# Patient Record
Sex: FEMALE | Race: WHITE | NOT HISPANIC OR LATINO | URBAN - METROPOLITAN AREA
[De-identification: names, ages, dates, MRNs, and addresses within clinical notes are randomized per-mention and may not be internally consistent; named-entity substitution may affect disease eponyms.]

---

## 2023-08-28 ENCOUNTER — HOSPITAL ENCOUNTER (OUTPATIENT)
Dept: DATA CONVERSION | Facility: HOSPITAL | Age: 68
Discharge: HOME | End: 2023-08-28
Payer: MEDICARE

## 2023-08-28 DIAGNOSIS — Z90.710 ACQUIRED ABSENCE OF BOTH CERVIX AND UTERUS: ICD-10-CM

## 2023-08-28 DIAGNOSIS — R10.32 LEFT LOWER QUADRANT PAIN: ICD-10-CM

## 2023-08-28 DIAGNOSIS — R31.9 HEMATURIA, UNSPECIFIED: ICD-10-CM

## 2023-08-28 DIAGNOSIS — R30.0 DYSURIA: ICD-10-CM

## 2023-08-28 DIAGNOSIS — R10.9 UNSPECIFIED ABDOMINAL PAIN: ICD-10-CM

## 2023-08-28 DIAGNOSIS — N93.9 ABNORMAL UTERINE AND VAGINAL BLEEDING, UNSPECIFIED: ICD-10-CM

## 2023-08-28 LAB
ABO + RH BLD: NORMAL
ANION GAP SERPL CALCULATED.3IONS-SCNC: 9 MMOL/L (ref 0–19)
BACTERIA SPEC CULT: NORMAL
BACTERIA UR QL AUTO: NEGATIVE
BASOPHILS # BLD AUTO: 0.02 K/UL (ref 0–0.22)
BASOPHILS NFR BLD AUTO: 0.2 % (ref 0–1)
BILIRUB UR QL STRIP.AUTO: NEGATIVE
BLD GP AB SCN SERPL QL: NEGATIVE
BLD PROD TYP BPU: NORMAL
BUN SERPL-MCNC: 14 MG/DL (ref 8–25)
BUN/CREAT SERPL: 20 RATIO (ref 8–21)
CALCIUM SERPL-MCNC: 9.1 MG/DL (ref 8.5–10.4)
CASTS NOT SPECIF #/AREA UR COMP ASSIST: ABNORMAL /LPF (ref 0–3)
CC # UR: NORMAL /UL
CHLORIDE SERPL-SCNC: 103 MMOL/L (ref 97–107)
CLARITY UR: ABNORMAL
CO2 SERPL-SCNC: 27 MMOL/L (ref 24–31)
COLOR UR: ABNORMAL
CREAT SERPL-MCNC: 0.7 MG/DL (ref 0.4–1.6)
DEPRECATED RDW RBC AUTO: 43.8 FL (ref 37–54)
DIFFERENTIAL METHOD BLD: ABNORMAL
EOSINOPHIL # BLD AUTO: 0.16 K/UL (ref 0–0.45)
EOSINOPHIL NFR BLD: 1.4 % (ref 0–3)
ERYTHROCYTE [DISTWIDTH] IN BLOOD BY AUTOMATED COUNT: 12.6 % (ref 11.7–15)
GFR SERPL CREATININE-BSD FRML MDRD: 95 ML/MIN/1.73 M2
GLUCOSE SERPL-MCNC: 113 MG/DL (ref 65–99)
GLUCOSE UR STRIP.AUTO-MCNC: NEGATIVE MG/DL
HCG UR QL: NEGATIVE
HCT VFR BLD AUTO: 41.2 % (ref 36–44)
HGB BLD-MCNC: 13.9 GM/DL (ref 12–15)
HGB UR QL STRIP.AUTO: 2545 /HPF (ref 0–3)
HGB UR QL: ABNORMAL
HX OF BLOOD TRANSFUSION: NORMAL
HYALINE CASTS UR QL AUTO: 15 /LPF
IMM GRANULOCYTES # BLD AUTO: 0.02 K/UL (ref 0–0.1)
KETONES UR QL STRIP.AUTO: NEGATIVE
LEUKOCYTE ESTERASE UR QL STRIP.AUTO: ABNORMAL
LYMPHOCYTES # BLD AUTO: 1.31 K/UL (ref 1.2–3.2)
LYMPHOCYTES NFR BLD MANUAL: 11.5 % (ref 20–40)
MCH RBC QN AUTO: 32 PG (ref 26–34)
MCHC RBC AUTO-ENTMCNC: 33.7 % (ref 31–37)
MCV RBC AUTO: 94.7 FL (ref 80–100)
MICROSCOPIC (UA): ABNORMAL
MONOCYTES # BLD AUTO: 0.67 K/UL (ref 0–0.8)
MONOCYTES NFR BLD MANUAL: 5.9 % (ref 0–8)
NEUTROPHILS # BLD AUTO: 9.25 K/UL
NEUTROPHILS # BLD AUTO: 9.25 K/UL (ref 1.8–7.7)
NEUTROPHILS.IMMATURE NFR BLD: 0.2 % (ref 0–1)
NEUTS SEG NFR BLD: 80.8 % (ref 50–70)
NITRITE UR QL STRIP.AUTO: NEGATIVE
NRBC BLD-RTO: 0 /100 WBC
NUM BPU REQUESTED: 0
PH UR STRIP.AUTO: 7 [PH] (ref 4.6–8)
PLATELET # BLD AUTO: 226 K/UL (ref 150–450)
PMV BLD AUTO: 10.6 CU (ref 7–12.6)
POTASSIUM SERPL-SCNC: 3.8 MMOL/L (ref 3.4–5.1)
PROT UR STRIP.AUTO-MCNC: 200 MG/DL
RBC # BLD AUTO: 4.35 M/UL (ref 4–4.9)
REPORT STATUS -LH SQ DATA CONVERSION: NORMAL
SERVICE CMNT-IMP: NORMAL
SODIUM SERPL-SCNC: 139 MMOL/L (ref 133–145)
SP GR UR STRIP.AUTO: 1.01 (ref 1–1.03)
SPECIMEN EXP DATE BLD: NORMAL
SPECIMEN SOURCE: NORMAL
SQUAMOUS UR QL AUTO: ABNORMAL /HPF
TEST ORDERED: NORMAL
TRANSF BAND NUM PATIENT: NORMAL
URINE CULTURE: ABNORMAL
UROBILINOGEN UR QL STRIP.AUTO: NORMAL MG/DL (ref 0–1)
WBC # BLD AUTO: 11.4 K/UL (ref 4.5–11)
WBC #/AREA URNS AUTO: 386 /HPF (ref 0–3)

## 2023-09-20 ENCOUNTER — HOSPITAL ENCOUNTER (OUTPATIENT)
Dept: DATA CONVERSION | Facility: HOSPITAL | Age: 68
Discharge: HOME | End: 2023-09-20
Payer: MEDICARE

## 2023-09-20 DIAGNOSIS — N89.8 OTHER SPECIFIED NONINFLAMMATORY DISORDERS OF VAGINA: ICD-10-CM

## 2023-09-21 DIAGNOSIS — N39.3 FEMALE STRESS INCONTINENCE: ICD-10-CM

## 2023-09-21 DIAGNOSIS — N94.10 DYSPAREUNIA, FEMALE: Primary | ICD-10-CM

## 2023-09-21 LAB
C GLABRATA DNA VAG QL NAA+PROBE: NEGATIVE
C KRUSEI DNA VAG QL NAA+PROBE: NEGATIVE
CANDIDA DNA VAG QL PROBE+SIG AMP: POSITIVE
T VAGINALIS DNA VAG QL PROBE+SIG AMP: NEGATIVE
VAGINOSIS/ITIS DNA PNL VAG PROBE+SIG AMP: NEGATIVE

## 2023-10-05 DIAGNOSIS — N94.10 DYSPAREUNIA, FEMALE: Primary | ICD-10-CM

## 2023-10-05 DIAGNOSIS — N39.3 FEMALE STRESS INCONTINENCE: ICD-10-CM

## 2023-10-19 ENCOUNTER — APPOINTMENT (OUTPATIENT)
Dept: AUDIOLOGY | Facility: CLINIC | Age: 68
End: 2023-10-19
Payer: MEDICARE

## 2023-10-26 ENCOUNTER — CLINICAL SUPPORT (OUTPATIENT)
Dept: PREADMISSION TESTING | Facility: HOSPITAL | Age: 68
End: 2023-10-26
Payer: MEDICARE

## 2023-10-26 ENCOUNTER — TELEPHONE (OUTPATIENT)
Dept: PRIMARY CARE | Facility: CLINIC | Age: 68
End: 2023-10-26

## 2023-10-26 VITALS
WEIGHT: 127.65 LBS | OXYGEN SATURATION: 99 % | HEART RATE: 76 BPM | DIASTOLIC BLOOD PRESSURE: 58 MMHG | SYSTOLIC BLOOD PRESSURE: 102 MMHG | TEMPERATURE: 96.8 F | BODY MASS INDEX: 21.79 KG/M2 | HEIGHT: 64 IN

## 2023-10-26 PROCEDURE — 99203 OFFICE O/P NEW LOW 30 MIN: CPT | Performed by: PHYSICIAN ASSISTANT

## 2023-10-26 RX ORDER — ESTRADIOL 1 MG/1
1 TABLET ORAL 2 TIMES WEEKLY
COMMUNITY
End: 2023-11-06 | Stop reason: ALTCHOICE

## 2023-10-26 RX ORDER — CHOLECALCIFEROL (VITAMIN D3) 50 MCG
50 TABLET ORAL DAILY
COMMUNITY
End: 2023-12-26 | Stop reason: WASHOUT

## 2023-10-26 RX ORDER — CETIRIZINE HYDROCHLORIDE 1 MG/ML
10 SOLUTION ORAL DAILY PRN
COMMUNITY
End: 2023-11-06 | Stop reason: ALTCHOICE

## 2023-10-26 RX ORDER — CALCIUM CARBONATE 500(1250)
2 TABLET ORAL DAILY
COMMUNITY
End: 2023-12-12 | Stop reason: WASHOUT

## 2023-10-26 ASSESSMENT — DUKE ACTIVITY SCORE INDEX (DASI)
CAN YOU DO MODERATE WORK AROUND THE HOUSE LIKE VACUUMING, SWEEPING FLOORS OR CARRYING GROCERIES: YES
TOTAL_SCORE: 58.2
DASI METS SCORE: 9.9
CAN YOU PARTICIPATE IN MODERATE RECREATIONAL ACTIVITIES LIKE GOLF, BOWLING, DANCING, DOUBLES TENNIS OR THROWING A BASEBALL OR FOOTBALL: YES
CAN YOU CLIMB A FLIGHT OF STAIRS OR WALK UP A HILL: YES
CAN YOU RUN A SHORT DISTANCE: YES
CAN YOU DO LIGHT WORK AROUND THE HOUSE LIKE DUSTING OR WASHING DISHES: YES
CAN YOU DO YARD WORK LIKE RAKING LEAVES, WEEDING OR PUSHING A MOWER: YES
CAN YOU TAKE CARE OF YOURSELF (EAT, DRESS, BATHE, OR USE TOILET): YES
CAN YOU HAVE SEXUAL RELATIONS: YES
CAN YOU PARTICIPATE IN STRENOUS SPORTS LIKE SWIMMING, SINGLES TENNIS, FOOTBALL, BASKETBALL, OR SKIING: YES
CAN YOU DO HEAVY WORK AROUND THE HOUSE LIKE SCRUBBING FLOORS OR LIFTING AND MOVING HEAVY FURNITURE: YES
CAN YOU WALK INDOORS, SUCH AS AROUND YOUR HOUSE: YES
CAN YOU WALK A BLOCK OR TWO ON LEVEL GROUND: YES

## 2023-10-26 ASSESSMENT — CHADS2 SCORE
AGE GREATER THAN OR EQUAL TO 75: NO
CHF: NO
DIABETES: NO
HYPERTENSION: NO
CHADS2 SCORE: 0
PRIOR STROKE OR TIA OR THROMBOEMBOLISM: NO

## 2023-10-26 ASSESSMENT — PAIN SCALES - GENERAL: PAINLEVEL_OUTOF10: 0 - NO PAIN

## 2023-10-26 ASSESSMENT — LIFESTYLE VARIABLES: SMOKING_STATUS: NONSMOKER

## 2023-10-26 ASSESSMENT — PAIN - FUNCTIONAL ASSESSMENT: PAIN_FUNCTIONAL_ASSESSMENT: 0-10

## 2023-10-26 NOTE — TELEPHONE ENCOUNTER
Patient left message that she has questions about her ENT referral. Stated she saw ENT and they removed the wax from her ears but she was told she could be a candidate for cochlear implant. She wants to know how she would go about seeing if she can get one? Also requesting a referral to podiatry for a fungus on her big toe.

## 2023-10-26 NOTE — H&P (VIEW-ONLY)
CPM/PAT Evaluation       Name: Nichole Boss (Nichole Boss)  /Age: 1955/68 y.o.     In-Person       Chief Complaint: Hematuria    HPI 68-year-old female complains of hematuria with clots and pain 1 month ago.  She was started on IV antibiotics.  She states her urine has been clear and she no longer has pain.  Patient is scheduled for cystoscopy with bilateral retrograde pyelogram 2023    Past Medical History:   Diagnosis Date    Awareness under anesthesia     Bilateral hearing loss     Does not wear her hearing aids on a daily basis       Past Surgical History:   Procedure Laterality Date    HYSTERECTOMY      MYOMECTOMY      STAPEDECTOMY      x3       Patient  has no history on file for sexual activity.    No family history on file.    No Known Allergies    Prior to Admission medications    Medication Sig Start Date End Date Taking? Authorizing Provider   calcium carbonate (Oscal) 500 mg calcium (1,250 mg) tablet Take 2 tablets (2,500 mg) by mouth once daily.    Historical Provider, MD   cetirizine (ZyrTEC) 1 mg/mL syrup Take 10 mL (10 mg) by mouth once daily as needed for allergies.    Historical Provider, MD   cholecalciferol (Vitamin D-3) 50 MCG (2000 UT) tablet Take 1 tablet (50 mcg) by mouth once daily.    Historical Provider, MD   estradiol (Estrace) 1 mg tablet Take 1 tablet (1 mg) by mouth 2 times a week.    Historical Provider, MD   estrogens, conjugated, (Premarin) vaginal cream Insert 0.5 g into the vagina 3 (three) times a week. COMPOUNDED WITH TESTOSTERONE PRESCRIBED BY DR. FALLON    Historical Provider, MD   multivit-min/iron/FA/vit K/lut (CENTRUM SILVER WOMEN ORAL) Take 1 capsule by mouth once daily.    Historical Provider, MD        Review of Systems   All other systems reviewed and are negative.       Physical Exam  Vitals reviewed. Physical exam within normal limits.   Constitutional:       Appearance: Normal appearance.   HENT:      Head: Normocephalic and atraumatic.       "Mouth/Throat:      Mouth: Mucous membranes are moist.   Eyes:      Extraocular Movements: Extraocular movements intact.      Pupils: Pupils are equal, round, and reactive to light.   Cardiovascular:      Rate and Rhythm: Normal rate and regular rhythm.   Pulmonary:      Effort: Pulmonary effort is normal.      Breath sounds: Normal breath sounds.   Abdominal:      General: Bowel sounds are normal.      Palpations: Abdomen is soft.   Musculoskeletal:         General: Normal range of motion.      Cervical back: Normal range of motion.   Skin:     General: Skin is warm and dry.   Neurological:      General: No focal deficit present.      Mental Status: She is alert and oriented to person, place, and time.   Psychiatric:         Mood and Affect: Mood normal.         Behavior: Behavior normal.         Thought Content: Thought content normal.         Judgment: Judgment normal.          PAT AIRWAY:   Airway:     Mallampati::  I    TM distance::  >3 FB    Neck ROM::  Full    Vitals  Heart Rate:  [76]   Temp:  [36 °C (96.8 °F)]   BP: (102)/(58)   Height:  [162.6 cm (5' 4\")]   Weight:  [57.9 kg (127 lb 10.3 oz)]   SpO2:  [99 %]        DASI Risk Score      Flowsheet Row Most Recent Value   DASI SCORE 58.2   METS Score (Will be calculated only when all the questions are answered) 9.9          Caprini DVT Assessment      Flowsheet Row Most Recent Value   DVT Score 5   Current Status Minor surgery planned, Varicose veins   Age 60-75 years   BMI 30 or less          Modified Frailty Index    No data to display       CHADS2 Stroke Risk  Current as of 2 hours ago        N/A 3 - 100%: High Risk   2 - 3%: Medium Risk   0 - 2%: Low Risk     Last Change: N/A          This score determines the patient's risk of having a stroke if the patient has atrial fibrillation.        This score is not applicable to this patient. Components are not calculated.          Revised Cardiac Risk Index      Flowsheet Row Most Recent Value   Revised Cardiac " Risk Calculator 0          Apfel Simplified Score      Flowsheet Row Most Recent Value   Apfel Simplified Score Calculator 2          Risk Analysis Index Results This Encounter    No data found in the last 1 encounters.       Stop Bang Score      Flowsheet Row Most Recent Value   Do you snore loudly? 0   Do you often feel tired or fatigued after your sleep? 0   Has anyone ever observed you stop breathing in your sleep? 0   Do you have or are you being treated for high blood pressure? 0   Recent BMI (Calculated) 21.9   Is BMI greater than 35 kg/m2? 0=No   Age older than 50 years old? 1=Yes   Is your neck circumference greater than 17 inches (Male) or 16 inches (Female)? 0            Assessment and Plan:     Hematuria        Plan: Cystoscopy with bilateral retrograde pyelogram 11/03/2023    2.  ASA I

## 2023-10-26 NOTE — PREPROCEDURE INSTRUCTIONS
Medication List            Accurate as of October 26, 2023  8:58 AM. Always use your most recent med list.                calcium carbonate 500 mg calcium (1,250 mg) tablet  Commonly known as: Oscal  Medication Adjustments for Surgery: Stop 7 days before surgery     CENTRUM SILVER WOMEN ORAL  Medication Adjustments for Surgery: Stop 7 days before surgery     cetirizine 1 mg/mL syrup  Commonly known as: ZyrTEC  Medication Adjustments for Surgery: Continue until night before surgery     cholecalciferol 50 MCG (2000 UT) tablet  Commonly known as: Vitamin D-3  Medication Adjustments for Surgery: Stop 7 days before surgery     estradiol 1 mg tablet  Commonly known as: Estrace  Medication Adjustments for Surgery: Continue until night before surgery     Premarin vaginal cream  Generic drug: estrogens (conjugated)  Medication Adjustments for Surgery: Continue until night before surgery                              NPO Instructions:    Do not eat any food after midnight the night before your surgery/procedure.  You may have clear liquids until TWO hours before surgery/procedure. This includes water, black tea/coffee, (no milk or cream) apple juice and electrolyte drinks (Gatorade).    Additional Instructions:     The Day before Surgery:  Review your medication instructions, stop indicated medications  You will be contacted regarding the time of your arrival to facility and surgery time  Do not eat any food after Midnight  Day of Surgery:  Review your medication instructions, take indicated medications  You may have clear liquids until TWO hours before surgery/procedure.  This includes water, black tea/coffee, (no milk or cream) apple juice and electrolyte drinks (Gatorade)  Wear  comfortable loose fitting clothing  Do not use moisturizers, creams, lotions or perfume  All jewelry and valuables should be left at home    PAT DISCHARGE INSTRUCTIONS    Please call the Same Day Surgery (SDS) Department of the hospital where  your procedure will be performed after 2:00 PM the day before your surgery. If you are scheduled on a Monday, or a Tuesday following a Monday holiday, you will need to call on the last business day prior to your surgery.    [facility]  [address] [place] [address], [address]  [phone]    [facility]  [address] [place] [address] [phone]    [facility]  [address]  [place] [id number]  [phone]    Please let your surgeon know if:      You develop any open sores, shingles, burning or painful urination as these may increase your risk of an infection.   You no longer wish to have the surgery.   Any other personal circumstances change that may lead to the need to cancel or defer this surgery-such as being sick or getting admitted to any hospital within one week of your planned procedure.    Your contact details change, such as a change of address or phone number.    Starting now:     Please DO NOT drink alcohol or smoke for 24 hours before surgery. It is well known that quitting smoking can make a huge difference to your health and recovery from surgery. The longer you abstain from smoking, the better your chances of a healthy recovery. If you need help with quitting, call 2-020-QUIT-NOW to be connected to a trained counselor who will discuss the best methods to help you quit.     Before your surgery:    Please stop all supplements 7 days prior to surgery. Or as directed by your surgeon.   Please stop taking NSAID pain medicine such as Advil and Motrin 7 days before surgery.    If you develop any fever, cough, cold, rashes, cuts, scratches, scrapes, urinary symptoms or infection anywhere on your body (including teeth and gums) prior to surgery, please call your surgeon’s office as soon as possible. This may require treatment to reduce the chance of cancellation on the day of surgery.    The day before your surgery:   DIET- Do not eat any food after  MIDNIGHT. May have 10 ounces of CLEAR LIQUIDS until TWO HOURS before your arrival time. This includes water, black tea or coffee (no milk ir cream), apple juice and electrolyte drinks (Gatorade). May chew gum until TWO hours before your surgery time.   Get a good night’s rest.  Use the special soap for bathing if you have been instructed to use one.    Scheduled surgery times may change and you will be notified if this occurs - please check your personal voicemail for any updates.     On the morning of surgery:   Wear comfortable, loose fitting clothes which open in the front. Please do not wear moisturizers, creams, lotions, makeup or perfume.    Please bring with you to surgery:   Photo ID and insurance card   Current list of medicines and allergies   Pacemaker/ Defibrillator/Heart stent cards   CPAP machine and mask    Slings/ splints/ crutches   A copy of your complete advanced directive/DHPOA.    Please do NOT bring with you to surgery:   All jewelry and valuables should be left at home.   Prosthetic devices such as contact lenses, hearing aids, dentures, eyelash extensions, hairpins and body piercings must be removed prior to going in to the surgical suite.    After outpatient surgery:   A responsible adult MUST accompany you at the time of discharge and stay with you for 24 hours after your surgery. You may NOT drive yourself home after surgery.    Do not drive, operate machinery, make critical decisions or do activities that require co-ordination or balance until after a night’s sleep.   Do not drink alcoholic beverages for 24 hours.   Instructions for resuming your medications will be provided by your surgeon.    CALL YOUR DOCTOR AFTER SURGERY IF YOU HAVE:     Chills and/or a fever of 101° F or higher.    Redness, swelling, pus or drainage from your surgical wound or a bad smell from the wound.    Lightheadedness, fainting or confusion.    Persistent vomiting (throwing up) and are not able to eat or drink  for 12 hours.    Three or more loose, watery bowel movements in 24 hours (diarrhea).   Difficulty or pain while urinating( after non-urological surgery)    Pain and swelling in your legs, especially if it is only on one side.    Difficulty breathing or are breathing faster than normal.    Any new concerning symptoms.

## 2023-10-31 ENCOUNTER — APPOINTMENT (OUTPATIENT)
Dept: PHYSICAL THERAPY | Facility: CLINIC | Age: 68
End: 2023-10-31
Payer: MEDICARE

## 2023-10-31 ENCOUNTER — TREATMENT (OUTPATIENT)
Dept: PHYSICAL THERAPY | Facility: CLINIC | Age: 68
End: 2023-10-31
Payer: MEDICARE

## 2023-10-31 DIAGNOSIS — N39.3 FEMALE STRESS INCONTINENCE: ICD-10-CM

## 2023-10-31 DIAGNOSIS — N94.10 DYSPAREUNIA, FEMALE: ICD-10-CM

## 2023-10-31 PROCEDURE — 97530 THERAPEUTIC ACTIVITIES: CPT | Mod: GP

## 2023-10-31 PROCEDURE — 97140 MANUAL THERAPY 1/> REGIONS: CPT | Mod: GP

## 2023-10-31 ASSESSMENT — ENCOUNTER SYMPTOMS
DEPRESSION: 0
OCCASIONAL FEELINGS OF UNSTEADINESS: 0
LOSS OF SENSATION IN FEET: 0

## 2023-10-31 ASSESSMENT — PAIN SCALES - GENERAL: PAINLEVEL_OUTOF10: 0 - NO PAIN

## 2023-10-31 ASSESSMENT — PAIN - FUNCTIONAL ASSESSMENT: PAIN_FUNCTIONAL_ASSESSMENT: 0-10

## 2023-10-31 NOTE — PROGRESS NOTES
This is a 68 year old female for REFERRAL TO PODIATRY and to ENT for testing and potential implant of COCHLEAR IMPLANT and had been seen in ER for BLOOD IN URINE.  REFERRED BY Dr San to UROLOGY Dr De Los Santos[facility].  NEG UTI.  Not a caruncle and had NEG CYSTOSCOPY and has FU and may have passed a small kidney stone.    HAS A toenail lesion on several toes       ROS is NEG for HEADACHE, NAUSEA, VOMITING, DIARRHEA, CHEST PAIN, SOB, and BLEEDING and as further REVIEWED BELOW.      Subjective   Nichole Boss is a 68 y.o. female who presents for No chief complaint on file..    HPI:        Review of systems is essentially negative for all systems except for any identified issues in HPI above.    Objective     There were no vitals taken for this visit.     Physical Examination:       GENERAL           General Appearance: well-appearing, well-developed, well-hydrated, well-nourished, no acute distress.        HEENT           NECK supple, no masses or thyromegaly, no carotid bruit.        EYES           Extraocular Movements: normal, bilateral eyes RUFINO, no conjunctival injection.        HEART           Rate and Rhythm regular rate and rhythm. Heart sounds: normal S1S2, no S3 or S4. Murmurs: none.        CHEST           Breath sounds: Clear to IPPA, RR<16 no use of accessory muscles.        ABDOMEN           General: Neg for LKKS or masses, no scleral icterus or jaundice.        MUSCULOSKELETAL           Joints Demonstration: Neg for erythema, swelling or joint deformities. gross abnormalities no gross abnormalities.        EXTREMITIES           Lower Extremities: Neg for cyanosis, clubbing or edema. THICKENED TOENAILS c/w ONYCHOMYCOSIS DP and PT intact bilaterally      Assessment/Plan   Problem List Items Addressed This Visit    None      FOLLOW UP:  PRN and as specified above         Shae San M.D.

## 2023-10-31 NOTE — PROGRESS NOTES
"Physical Therapy Treatment    Patient Name: Nichole Boss  MRN: [id number]  Today's Date: 10/31/2023  Time Calculation  Start Time: 0835  Stop Time: 0935  Time Calculation (min): 60 min  PT Therapeutic Procedures Time Entry  Manual Therapy Time Entry: 40  Therapeutic Activity Time Entry: 15  Visit # 17/17    Current Problem   1. Dyspareunia, female  Follow Up In Physical Therapy      2. Female stress incontinence  Follow Up In Physical Therapy          Subjective   General    Did not actually have UTI when she was here last on 8/29. Culture negative. She stopped antibiotics after a week and then got yeast infection. She has not had any further hematuria, no further pain. Follow up with urology and he wants to explore with cystoscopy and pyelogram on 11/3 under anesthesia.  Possible that she passed a kidney stone.  She feels through all of this dyspareunia has increased a bit. Continues to use estrogen pill and compounded estrogen/testosterone cream, but had to stop for a few weeks when treating for yeast. Yeast has been resolved for a few weeks.  Marinoff = 1  Bowels have been \"good.\" Thinks she will purchase YaBattle device.    Precautions:  Precautions  STEADI Fall Risk Score (The score of 4 or more indicates an increased risk of falling): 0  Pain   Pain Assessment: 0-10  Pain Score: 0 - No pain    Post Treatment Pain Level 2/10  Increased pain level during internal.    Objective   Findings:   Internal assessment of pelvic floor muscles (vaginal) completed in supine position with patient consent.    Observation:  Mild vulvar dryness    Internal:  Superficial layer mm   Min tender bilat bulbo  Very minimal tenderness at posterior introitus and STP    Deep layer:  No signficant tension, but ++tender L>R PC, ileococcygeus  Notable vaginal wall restriction R>L side    Muscle Excursion  Fair lengthening excursion  Good activation with cues    Strength  Not assessed today    Prolapse  none observed  Treatments:  Therapeutic " Activity  Therapeutic Activity Performed: Yes  Therapeutic Activity 1: Symptom review and education re: difference between tissue restriction of vaginal wall vs mm tension and overactivity. Reviewed pt's home use  of pelvic wand and potential benefits of OhNut device.    Manual Therapy  Manual Therapy Performed: Yes  Manual Therapy Activity 1: Internal (vaginal) with pt consent: MFR to 1st layer pfm including bimanual tissue mobility of bulbocavernosus. Gentle ttp release bilat levator ani and OI with variable hip position. Bimanual treatment as tolerated with second hand over glute or abdominal wall.      Assessment    Pt is overall improved, and progressing towards goals, but dyspareunia seems to have increased a bit since recent episode of hematuria and subsequent yeast infection. Pt responds well to manual therapy and is compliant with home exercises and use of pelvic wand on her own. Likely that post menopausal/post hysterectomy vaginal tissues are contributing to symptoms and pt continues to use estrogen orally and compounded estrogen/testosterone topically.  This pt will benefit from continued intermittent treatment sessions to address symptoms and especially following upcoming cystoscopy.     Plan:    Extend time frame of goals to visit #24 and reduce frequency to every 2-3 weeks for next 6 visits. Pt to try OhNut to control depth of penetration for intercourse and monitor symptoms.         Goals:   Active       PT Goals       demos L hip ER ROM improved to = R  (Progressing)       Start:  09/21/23    Expected End:  11/17/23            report 1-2/3 or less on Marinoff Dyspareunia scale (PARTIALLY ACHIEVED - continues to be 2/3 most times, but last experience was 1/3, depth of penetration is improved without abdominal wall pain)  (Progressing)       Start:  09/21/23    Expected End:  11/17/23            report improved ease of bowel evacution with stool/squatty potty and breath strategies to decrease BM per  day to 2 or less (PARTIALLY ACHIEVED, reduced 5-6x/ day to 3-4x/day, a little easier, more soft/bulky)  (Progressing)       Start:  09/21/23    Expected End:  11/17/23

## 2023-11-02 ENCOUNTER — ANESTHESIA EVENT (OUTPATIENT)
Dept: OPERATING ROOM | Facility: HOSPITAL | Age: 68
End: 2023-11-02
Payer: MEDICARE

## 2023-11-03 ENCOUNTER — HOSPITAL ENCOUNTER (OUTPATIENT)
Facility: HOSPITAL | Age: 68
Setting detail: OUTPATIENT SURGERY
Discharge: HOME | End: 2023-11-03
Attending: UROLOGY | Admitting: UROLOGY
Payer: MEDICARE

## 2023-11-03 ENCOUNTER — APPOINTMENT (OUTPATIENT)
Dept: RADIOLOGY | Facility: HOSPITAL | Age: 68
End: 2023-11-03
Payer: MEDICARE

## 2023-11-03 ENCOUNTER — ANESTHESIA (OUTPATIENT)
Dept: OPERATING ROOM | Facility: HOSPITAL | Age: 68
End: 2023-11-03
Payer: MEDICARE

## 2023-11-03 VITALS
SYSTOLIC BLOOD PRESSURE: 97 MMHG | HEIGHT: 64 IN | OXYGEN SATURATION: 100 % | DIASTOLIC BLOOD PRESSURE: 50 MMHG | HEART RATE: 66 BPM | WEIGHT: 125.66 LBS | RESPIRATION RATE: 16 BRPM | BODY MASS INDEX: 21.45 KG/M2 | TEMPERATURE: 97.2 F

## 2023-11-03 DIAGNOSIS — N94.10 DYSPAREUNIA, FEMALE: Primary | ICD-10-CM

## 2023-11-03 PROCEDURE — 3600000003 HC OR TIME - INITIAL BASE CHARGE - PROCEDURE LEVEL THREE: Performed by: UROLOGY

## 2023-11-03 PROCEDURE — 3700000001 HC GENERAL ANESTHESIA TIME - INITIAL BASE CHARGE: Performed by: UROLOGY

## 2023-11-03 PROCEDURE — 74420 UROGRAPHY RTRGR +-KUB: CPT

## 2023-11-03 PROCEDURE — 2500000004 HC RX 250 GENERAL PHARMACY W/ HCPCS (ALT 636 FOR OP/ED): Performed by: ANESTHESIOLOGIST ASSISTANT

## 2023-11-03 PROCEDURE — 3700000002 HC GENERAL ANESTHESIA TIME - EACH INCREMENTAL 1 MINUTE: Performed by: UROLOGY

## 2023-11-03 PROCEDURE — A52005 PR CYSTOURETHROSCOPY,URETER CATHETER: Performed by: ANESTHESIOLOGIST ASSISTANT

## 2023-11-03 PROCEDURE — 7100000002 HC RECOVERY ROOM TIME - EACH INCREMENTAL 1 MINUTE: Performed by: UROLOGY

## 2023-11-03 PROCEDURE — A52005 PR CYSTOURETHROSCOPY,URETER CATHETER: Performed by: STUDENT IN AN ORGANIZED HEALTH CARE EDUCATION/TRAINING PROGRAM

## 2023-11-03 PROCEDURE — 2500000005 HC RX 250 GENERAL PHARMACY W/O HCPCS: Performed by: ANESTHESIOLOGIST ASSISTANT

## 2023-11-03 PROCEDURE — 7100000009 HC PHASE TWO TIME - INITIAL BASE CHARGE: Performed by: UROLOGY

## 2023-11-03 PROCEDURE — 3600000008 HC OR TIME - EACH INCREMENTAL 1 MINUTE - PROCEDURE LEVEL THREE: Performed by: UROLOGY

## 2023-11-03 PROCEDURE — 2550000001 HC RX 255 CONTRASTS: Performed by: UROLOGY

## 2023-11-03 PROCEDURE — 7100000001 HC RECOVERY ROOM TIME - INITIAL BASE CHARGE: Performed by: UROLOGY

## 2023-11-03 PROCEDURE — 2500000004 HC RX 250 GENERAL PHARMACY W/ HCPCS (ALT 636 FOR OP/ED): Performed by: UROLOGY

## 2023-11-03 PROCEDURE — 7100000010 HC PHASE TWO TIME - EACH INCREMENTAL 1 MINUTE: Performed by: UROLOGY

## 2023-11-03 RX ORDER — IPRATROPIUM BROMIDE 0.5 MG/2.5ML
500 SOLUTION RESPIRATORY (INHALATION) ONCE
Status: DISCONTINUED | OUTPATIENT
Start: 2023-11-03 | End: 2023-11-03 | Stop reason: HOSPADM

## 2023-11-03 RX ORDER — MIDAZOLAM HYDROCHLORIDE 1 MG/ML
INJECTION, SOLUTION INTRAMUSCULAR; INTRAVENOUS AS NEEDED
Status: DISCONTINUED | OUTPATIENT
Start: 2023-11-03 | End: 2023-11-03

## 2023-11-03 RX ORDER — HYDRALAZINE HYDROCHLORIDE 20 MG/ML
5 INJECTION INTRAMUSCULAR; INTRAVENOUS EVERY 30 MIN PRN
Status: DISCONTINUED | OUTPATIENT
Start: 2023-11-03 | End: 2023-11-03 | Stop reason: HOSPADM

## 2023-11-03 RX ORDER — LABETALOL HYDROCHLORIDE 5 MG/ML
5 INJECTION, SOLUTION INTRAVENOUS ONCE AS NEEDED
Status: DISCONTINUED | OUTPATIENT
Start: 2023-11-03 | End: 2023-11-03 | Stop reason: HOSPADM

## 2023-11-03 RX ORDER — DEXAMETHASONE SODIUM PHOSPHATE 4 MG/ML
INJECTION, SOLUTION INTRA-ARTICULAR; INTRALESIONAL; INTRAMUSCULAR; INTRAVENOUS; SOFT TISSUE AS NEEDED
Status: DISCONTINUED | OUTPATIENT
Start: 2023-11-03 | End: 2023-11-03

## 2023-11-03 RX ORDER — FENTANYL CITRATE 50 UG/ML
INJECTION, SOLUTION INTRAMUSCULAR; INTRAVENOUS AS NEEDED
Status: DISCONTINUED | OUTPATIENT
Start: 2023-11-03 | End: 2023-11-03

## 2023-11-03 RX ORDER — ONDANSETRON HYDROCHLORIDE 2 MG/ML
4 INJECTION, SOLUTION INTRAVENOUS ONCE AS NEEDED
Status: DISCONTINUED | OUTPATIENT
Start: 2023-11-03 | End: 2023-11-03 | Stop reason: HOSPADM

## 2023-11-03 RX ORDER — FENTANYL CITRATE 50 UG/ML
50 INJECTION, SOLUTION INTRAMUSCULAR; INTRAVENOUS EVERY 5 MIN PRN
Status: DISCONTINUED | OUTPATIENT
Start: 2023-11-03 | End: 2023-11-03 | Stop reason: HOSPADM

## 2023-11-03 RX ORDER — ALBUTEROL SULFATE 0.83 MG/ML
2.5 SOLUTION RESPIRATORY (INHALATION) ONCE AS NEEDED
Status: DISCONTINUED | OUTPATIENT
Start: 2023-11-03 | End: 2023-11-03 | Stop reason: HOSPADM

## 2023-11-03 RX ORDER — SODIUM CHLORIDE 9 MG/ML
50 INJECTION, SOLUTION INTRAVENOUS CONTINUOUS
Status: DISCONTINUED | OUTPATIENT
Start: 2023-11-03 | End: 2023-11-03 | Stop reason: HOSPADM

## 2023-11-03 RX ORDER — DIPHENHYDRAMINE HYDROCHLORIDE 50 MG/ML
12.5 INJECTION INTRAMUSCULAR; INTRAVENOUS ONCE AS NEEDED
Status: DISCONTINUED | OUTPATIENT
Start: 2023-11-03 | End: 2023-11-03 | Stop reason: HOSPADM

## 2023-11-03 RX ORDER — CEFAZOLIN SODIUM 2 G/100ML
2 INJECTION, SOLUTION INTRAVENOUS ONCE
Status: COMPLETED | OUTPATIENT
Start: 2023-11-03 | End: 2023-11-03

## 2023-11-03 RX ORDER — LIDOCAINE HYDROCHLORIDE 10 MG/ML
INJECTION INFILTRATION; PERINEURAL AS NEEDED
Status: DISCONTINUED | OUTPATIENT
Start: 2023-11-03 | End: 2023-11-03

## 2023-11-03 RX ORDER — SODIUM CHLORIDE, SODIUM LACTATE, POTASSIUM CHLORIDE, CALCIUM CHLORIDE 600; 310; 30; 20 MG/100ML; MG/100ML; MG/100ML; MG/100ML
100 INJECTION, SOLUTION INTRAVENOUS CONTINUOUS
Status: DISCONTINUED | OUTPATIENT
Start: 2023-11-03 | End: 2023-11-03 | Stop reason: HOSPADM

## 2023-11-03 RX ORDER — ONDANSETRON HYDROCHLORIDE 2 MG/ML
INJECTION, SOLUTION INTRAVENOUS AS NEEDED
Status: DISCONTINUED | OUTPATIENT
Start: 2023-11-03 | End: 2023-11-03

## 2023-11-03 RX ORDER — FENTANYL CITRATE 50 UG/ML
25 INJECTION, SOLUTION INTRAMUSCULAR; INTRAVENOUS EVERY 5 MIN PRN
Status: DISCONTINUED | OUTPATIENT
Start: 2023-11-03 | End: 2023-11-03 | Stop reason: HOSPADM

## 2023-11-03 RX ORDER — MEPERIDINE HYDROCHLORIDE 25 MG/ML
12.5 INJECTION INTRAMUSCULAR; INTRAVENOUS; SUBCUTANEOUS EVERY 10 MIN PRN
Status: DISCONTINUED | OUTPATIENT
Start: 2023-11-03 | End: 2023-11-03 | Stop reason: HOSPADM

## 2023-11-03 RX ORDER — FLUCONAZOLE 100 MG/1
150 TABLET ORAL ONCE
Status: SHIPPED | OUTPATIENT
Start: 2023-11-03

## 2023-11-03 RX ORDER — PROPOFOL 10 MG/ML
INJECTION, EMULSION INTRAVENOUS AS NEEDED
Status: DISCONTINUED | OUTPATIENT
Start: 2023-11-03 | End: 2023-11-03

## 2023-11-03 RX ADMIN — SODIUM CHLORIDE, SODIUM LACTATE, POTASSIUM CHLORIDE, AND CALCIUM CHLORIDE: 600; 310; 30; 20 INJECTION, SOLUTION INTRAVENOUS at 09:12

## 2023-11-03 RX ADMIN — PROPOFOL 200 MG: 10 INJECTION, EMULSION INTRAVENOUS at 09:16

## 2023-11-03 RX ADMIN — DEXAMETHASONE SODIUM PHOSPHATE 4 MG: 4 INJECTION, SOLUTION INTRAMUSCULAR; INTRAVENOUS at 09:20

## 2023-11-03 RX ADMIN — FENTANYL CITRATE 25 MCG: 50 INJECTION INTRAMUSCULAR; INTRAVENOUS at 09:20

## 2023-11-03 RX ADMIN — MIDAZOLAM 2 MG: 1 INJECTION INTRAMUSCULAR; INTRAVENOUS at 09:12

## 2023-11-03 RX ADMIN — LIDOCAINE HYDROCHLORIDE 50 MG: 10 INJECTION, SOLUTION INFILTRATION; PERINEURAL at 09:16

## 2023-11-03 RX ADMIN — FENTANYL CITRATE 25 MCG: 50 INJECTION INTRAMUSCULAR; INTRAVENOUS at 09:25

## 2023-11-03 RX ADMIN — FENTANYL CITRATE 25 MCG: 50 INJECTION INTRAMUSCULAR; INTRAVENOUS at 09:31

## 2023-11-03 RX ADMIN — FENTANYL CITRATE 25 MCG: 50 INJECTION INTRAMUSCULAR; INTRAVENOUS at 09:16

## 2023-11-03 RX ADMIN — ONDANSETRON 4 MG: 2 INJECTION INTRAMUSCULAR; INTRAVENOUS at 09:20

## 2023-11-03 RX ADMIN — CEFAZOLIN SODIUM 2 G: 2 INJECTION, SOLUTION INTRAVENOUS at 09:20

## 2023-11-03 SDOH — HEALTH STABILITY: MENTAL HEALTH: CURRENT SMOKER: 0

## 2023-11-03 ASSESSMENT — PAIN - FUNCTIONAL ASSESSMENT
PAIN_FUNCTIONAL_ASSESSMENT: 0-10

## 2023-11-03 ASSESSMENT — PAIN SCALES - GENERAL
PAINLEVEL_OUTOF10: 0 - NO PAIN

## 2023-11-03 ASSESSMENT — COLUMBIA-SUICIDE SEVERITY RATING SCALE - C-SSRS
1. IN THE PAST MONTH, HAVE YOU WISHED YOU WERE DEAD OR WISHED YOU COULD GO TO SLEEP AND NOT WAKE UP?: NO
2. HAVE YOU ACTUALLY HAD ANY THOUGHTS OF KILLING YOURSELF?: NO
6. HAVE YOU EVER DONE ANYTHING, STARTED TO DO ANYTHING, OR PREPARED TO DO ANYTHING TO END YOUR LIFE?: NO

## 2023-11-03 NOTE — ANESTHESIA POSTPROCEDURE EVALUATION
Patient: Nichole Boss    Procedure Summary       Date: 11/03/23 Room / Location: [facility] / Virtual [facility]    Anesthesia Start: 0912 Anesthesia Stop: 0946    Procedure: Cystoscopy with Retrograde Pyelogram (Bilateral) Diagnosis:       Hematuria, gross      (HEMATURIA R31.0)    Surgeons: Ger De Los Santos MD Responsible Provider: Ho Dao MD    Anesthesia Type: general ASA Status: 1            Anesthesia Type: general    Vitals Value Taken Time   /72 11/03/23 1005   Temp 36.1 °C (97 °F) 11/03/23 0945   Pulse 69 11/03/23 1005   Resp 18 11/03/23 1005   SpO2 100 % 11/03/23 1005       Anesthesia Post Evaluation    Patient location during evaluation: PACU  Patient participation: complete - patient participated  Level of consciousness: awake  Pain management: adequate  Multimodal analgesia pain management approach  Airway patency: patent  Cardiovascular status: acceptable and hemodynamically stable  Respiratory status: acceptable and spontaneous ventilation  Hydration status: euvolemic        There were no known notable events for this encounter.

## 2023-11-03 NOTE — ANESTHESIA PREPROCEDURE EVALUATION
Patient: Nichole Boss    Procedure Information       Date/Time: 11/03/23 0745    Procedure: Cystoscopy with Retrograde Pyelogram (Bilateral)    Location: [facility] / [facility]    Surgeons: Ger De Los Santos MD            Relevant Problems   Cardiovascular   (-) History of coronary artery bypass graft   (-) Pacemaker      Endocrine   (-) Diabetes mellitus, type 2 (CMS/HCC)      Neuro/Psych   (-) CVA (cerebral vascular accident) (CMS/[facility])   (-) Seizures (CMS/[facility])      Pulmonary   (-) Asthma   (-) Chronic obstructive pulmonary disease (CMS/[facility])   (-) BRIAN (obstructive sleep apnea)      Hematology   (-) Coagulopathy (CMS/[facility])       Clinical information reviewed:   Tobacco  Allergies  Meds   Med Hx  Surg Hx   Fam Hx  Soc Hx        NPO Detail:  NPO/Void Status  Date of Last Liquid: 11/02/23  Time of Last Liquid: 2200  Date of Last Solid: 11/02/23  Time of Last Solid: 2000  Time of Last Void: 0640         Physical Exam    Airway  Mallampati: II  TM distance: >3 FB  Neck ROM: full     Cardiovascular    Dental    Pulmonary    Abdominal            Anesthesia Plan    ASA 1     general     The patient is not a current smoker.    intravenous induction   Postoperative administration of opioids is intended.  Anesthetic plan and risks discussed with patient.  Use of blood products discussed with patient who consented to blood products.

## 2023-11-03 NOTE — PERIOPERATIVE NURSING NOTE
Patient alert and oriented. VS stable and O2 100% on room air. Patient tolerating PO fluids with no issues. Denies pain.

## 2023-11-03 NOTE — PERIOPERATIVE NURSING NOTE
Dr. De Los Santos states hanging Lactated Ringers instead of Normal Saline is highly acceptable.

## 2023-11-03 NOTE — PERIOPERATIVE NURSING NOTE
Patient in Phase 2; dressed and up to chair with RN assist. Tolerating po fluids, no complaint of pain and no complaint of nausea.     Significant other at bedside; discussed discharge instructions with patient and Significant other. All questions at this time answered.     Patient clinically appropriate for discharge. IV removed and patient transported to discharge area via wheelchair.    BP lower than previous but states she is not dizzy just groggy. Dr. Hernandez aware and over to see patient and ok with patient being discharged.  Educated patient on resting today and drinking lots of fluids.

## 2023-11-03 NOTE — ANESTHESIA PROCEDURE NOTES
Airway  Date/Time: 11/3/2023 9:16 AM  Urgency: elective      Staffing  Performed: BEV   Authorized by: Ho Dao MD    Performed by: BEV Espinoza  Patient location during procedure: OR    Indications and Patient Condition  Indications for airway management: anesthesia  Preoxygenated: yes      Final Airway Details  Final airway type: supraglottic airway      Successful airway: classic  Size 3     Number of attempts at approach: 1

## 2023-11-03 NOTE — OP NOTE
Cystoscopy with Retrograde Pyelogram (B) Operative Note     Date: 11/3/2023  OR Location: [facility]    Name: Nichole Boss, : 1955, Age: 68 y.o., MRN: [id number], Sex: female    Diagnosis  Pre-op Diagnosis     * Hematuria, gross [R31.0] Post-op Diagnosis     * Hematuria, gross [R31.0]     Procedures  Cystoscopy with Retrograde Pyelogram  36786 - [place] CYSTO BLADDER W/URETERAL CATHETERIZATION      Surgeons      * Ger De Los Santos - Primary    Resident/Fellow/Other Assistant:  Surgeon(s) and Role:    Procedure Summary  Anesthesia: Monitor Anesthesia Care  ASA: I  Anesthesia Staff: Anesthesiologist: Ho Dao MD  C-AA: BEV Espinoza  Estimated Blood Loss:  0 mL  Intra-op Medications: * No intraprocedure medications in log *           Anesthesia Record               Intraprocedure I/O Totals          Intake    .00 mL    Propofol Drip 0.00 mL    The total shown is the total volume documented since Anesthesia Start was filed.    ceFAZolin in dextrose (iso-os) (Ancef) IVPB 2 g 100.00 mL    Total Intake 600 mL       Output    Est. Blood Loss 0 mL    Total Output 0 mL       Net    Net Volume 600 mL          Specimen: No specimens collected     Staff:   Circulator: Pili Soto RN  Scrub Person: Ethel Mcdonald           Drains and/or Catheters: None    Findings: Normal urinary tract    Indications: Nichole Boss is an 68 y.o. female who is having surgery for HEMATURIA R31.0.  Patient underwent CT scan which was negative.  She presents today for cystoscopy and bilateral retrograde pyelograms.  Risk include infection bleeding and injury to urinary tract reviewed.  Written consent was obtained.    Procedure Details: Taken to the operating room and given general anesthesia.  She was sterilely prepped and draped in the modified lithotomy position.  A cystoscope was advanced through the urethra into the bladder.  Urethral meatus was only tight at the 21 Armenian sheath however no lesion was present.  No bladder tumors  or stones were identified.  No urethral lesions were present.  The ureteral orifice ease appeared normal.  Bilateral retrograde pyelograms performed and no abnormalities of the collecting systems were identified.  The bladder was drained and the cystoscope was withdrawn.  Patient tolerated the procedure was transferred to the recovery room in stable condition.    Complications:  None; patient tolerated the procedure well.          Attending Attestation: I was present and scrubbed for the entire procedure.    Ger De Los Santos  Phone Number: [phone]

## 2023-11-04 PROBLEM — N95.2 VAGINAL ATROPHY: Status: ACTIVE | Noted: 2023-11-04

## 2023-11-04 PROBLEM — R20.2 PARESTHESIA OF LEFT UPPER LIMB: Status: ACTIVE | Noted: 2023-11-04

## 2023-11-04 PROBLEM — B07.0 PLANTAR WART OF RIGHT FOOT: Status: RESOLVED | Noted: 2023-11-04 | Resolved: 2023-11-04

## 2023-11-04 PROBLEM — G56.02 LEFT CARPAL TUNNEL SYNDROME: Status: ACTIVE | Noted: 2023-11-04

## 2023-11-04 PROBLEM — H80.93 OTOSCLEROSIS OF BOTH EARS: Status: ACTIVE | Noted: 2023-11-04

## 2023-11-04 PROBLEM — F41.9 ANXIETY: Status: ACTIVE | Noted: 2023-11-04

## 2023-11-04 PROBLEM — N95.8 GENITOURINARY SYNDROME OF MENOPAUSE: Status: ACTIVE | Noted: 2023-11-04

## 2023-11-04 PROBLEM — Q52.5: Status: ACTIVE | Noted: 2023-11-04

## 2023-11-04 PROBLEM — M54.12 RADICULITIS OF LEFT CERVICAL REGION: Status: ACTIVE | Noted: 2023-11-04

## 2023-11-04 PROBLEM — J32.9 SINUSITIS: Status: ACTIVE | Noted: 2023-11-04

## 2023-11-04 PROBLEM — E78.49 OTHER HYPERLIPIDEMIA: Status: ACTIVE | Noted: 2023-11-04

## 2023-11-04 PROBLEM — C44.91 BASAL CELL CARCINOMA (BCC): Status: ACTIVE | Noted: 2023-11-04

## 2023-11-04 PROBLEM — F43.21 GRIEF: Status: ACTIVE | Noted: 2023-11-04

## 2023-11-04 PROBLEM — I73.9 PVD (PERIPHERAL VASCULAR DISEASE) (CMS-HCC): Status: ACTIVE | Noted: 2023-11-04

## 2023-11-04 PROBLEM — N94.89 VULVAR BURNING: Status: ACTIVE | Noted: 2023-11-04

## 2023-11-04 PROBLEM — N93.9 ABNORMAL VAGINAL BLEEDING: Status: ACTIVE | Noted: 2023-11-04

## 2023-11-04 PROBLEM — N76.1 SUBACUTE VAGINITIS: Status: ACTIVE | Noted: 2023-11-04

## 2023-11-04 PROBLEM — E55.9 VITAMIN D DEFICIENCY: Status: ACTIVE | Noted: 2023-11-04

## 2023-11-04 RX ORDER — LORATADINE 10 MG/1
1 TABLET ORAL DAILY
COMMUNITY
End: 2023-12-26 | Stop reason: WASHOUT

## 2023-11-04 RX ORDER — TRETINOIN
POWDER (GRAM) MISCELLANEOUS
COMMUNITY

## 2023-11-04 RX ORDER — ESTRADIOL 10 UG/1
1 INSERT VAGINAL NIGHTLY
COMMUNITY
End: 2024-04-24

## 2023-11-04 RX ORDER — BIOTIN 5 MG
1 CAPSULE ORAL 2 TIMES DAILY
COMMUNITY
Start: 2023-08-02

## 2023-11-04 RX ORDER — ESTRADIOL 0.1 MG/G
1 CREAM VAGINAL NIGHTLY
COMMUNITY
Start: 2016-02-24 | End: 2024-04-24 | Stop reason: WASHOUT

## 2023-11-06 ENCOUNTER — TELEPHONE (OUTPATIENT)
Dept: OBSTETRICS AND GYNECOLOGY | Facility: CLINIC | Age: 68
End: 2023-11-06

## 2023-11-06 ENCOUNTER — OFFICE VISIT (OUTPATIENT)
Dept: PRIMARY CARE | Facility: CLINIC | Age: 68
End: 2023-11-06
Payer: MEDICARE

## 2023-11-06 VITALS
HEIGHT: 63 IN | SYSTOLIC BLOOD PRESSURE: 104 MMHG | OXYGEN SATURATION: 98 % | WEIGHT: 125 LBS | BODY MASS INDEX: 22.15 KG/M2 | HEART RATE: 80 BPM | TEMPERATURE: 98 F | DIASTOLIC BLOOD PRESSURE: 68 MMHG

## 2023-11-06 DIAGNOSIS — Z71.85 IMMUNIZATION COUNSELING: ICD-10-CM

## 2023-11-06 DIAGNOSIS — M79.673 PAIN OF FOOT, UNSPECIFIED LATERALITY: ICD-10-CM

## 2023-11-06 DIAGNOSIS — Z79.890 HORMONE REPLACEMENT THERAPY (POSTMENOPAUSAL): Primary | ICD-10-CM

## 2023-11-06 DIAGNOSIS — B35.1 ONYCHOMYCOSIS: ICD-10-CM

## 2023-11-06 DIAGNOSIS — H91.90 HEARING LOSS ASSOCIATED WITH SYNDROME, UNSPECIFIED LATERALITY: Primary | ICD-10-CM

## 2023-11-06 DIAGNOSIS — B07.0 PLANTAR WART: ICD-10-CM

## 2023-11-06 PROCEDURE — 1126F AMNT PAIN NOTED NONE PRSNT: CPT | Performed by: FAMILY MEDICINE

## 2023-11-06 PROCEDURE — 1159F MED LIST DOCD IN RCRD: CPT | Performed by: FAMILY MEDICINE

## 2023-11-06 PROCEDURE — 99214 OFFICE O/P EST MOD 30 MIN: CPT | Performed by: FAMILY MEDICINE

## 2023-11-06 PROCEDURE — 1036F TOBACCO NON-USER: CPT | Performed by: FAMILY MEDICINE

## 2023-11-06 PROCEDURE — G0009 ADMIN PNEUMOCOCCAL VACCINE: HCPCS | Performed by: FAMILY MEDICINE

## 2023-11-06 ASSESSMENT — PATIENT HEALTH QUESTIONNAIRE - PHQ9
2. FEELING DOWN, DEPRESSED OR HOPELESS: NOT AT ALL
SUM OF ALL RESPONSES TO PHQ9 QUESTIONS 1 AND 2: 0
1. LITTLE INTEREST OR PLEASURE IN DOING THINGS: NOT AT ALL

## 2023-11-06 ASSESSMENT — PAIN SCALES - GENERAL
PAINLEVEL: 0-NO PAIN
PAINLEVEL_OUTOF10: 0 - NO PAIN

## 2023-11-06 NOTE — TELEPHONE ENCOUNTER
Est pt last seen 9/2023 called into the office requesting (estriol/testosterone) compound renewal to ([facility], pharmacy. Advised pt her request will be sent to the provider.

## 2023-11-07 ENCOUNTER — APPOINTMENT (OUTPATIENT)
Dept: PHYSICAL THERAPY | Facility: CLINIC | Age: 68
End: 2023-11-07
Payer: MEDICARE

## 2023-11-08 ENCOUNTER — HOSPITAL ENCOUNTER (OUTPATIENT)
Dept: RADIOLOGY | Facility: HOSPITAL | Age: 68
Discharge: HOME | End: 2023-11-08
Payer: MEDICARE

## 2023-11-08 VITALS — HEIGHT: 64 IN | BODY MASS INDEX: 21.34 KG/M2 | WEIGHT: 125 LBS

## 2023-11-08 DIAGNOSIS — Z12.31 ENCOUNTER FOR SCREENING MAMMOGRAM FOR MALIGNANT NEOPLASM OF BREAST: ICD-10-CM

## 2023-11-08 PROCEDURE — 77067 SCR MAMMO BI INCL CAD: CPT

## 2023-11-08 PROCEDURE — 77063 BREAST TOMOSYNTHESIS BI: CPT

## 2023-11-09 ENCOUNTER — CLINICAL SUPPORT (OUTPATIENT)
Dept: AUDIOLOGY | Facility: CLINIC | Age: 68
End: 2023-11-09
Payer: MEDICARE

## 2023-11-09 DIAGNOSIS — H90.6 MIXED CONDUCTIVE AND SENSORINEURAL HEARING LOSS OF BOTH EARS: ICD-10-CM

## 2023-11-09 PROCEDURE — 92626 EVAL AUD FUNCJ 1ST HOUR: CPT | Performed by: SOCIAL WORKER

## 2023-11-09 PROCEDURE — 92627 EVAL AUD FUNCJ EA ADDL 15: CPT | Performed by: SOCIAL WORKER

## 2023-11-09 NOTE — PROGRESS NOTES
Name:  Nichole Boss  :  1955  Age:  68 y.o.  Date of Evaluation: 23    HISTORY  Nichole Boss is seen today at the request of Dr. Saavedra for a bone anchored implant (BRENT) evaluation.  Nichole Boss arrives with a history of mixed hearing loss, otosclerosis and stapedectomy surgeries. Nichole Boss denies ear pain, ear pressure, ear drainage, ear infections, ear surgeries, tinnitus, noise exposure, family history of hearing loss and dizziness/vertigo.      UNAIDED EVALUATION 23    OTOSCOPY  Otoscopic inspection revealed clear canals and visualization of the eardrum bilaterally.    IMMITTANCE  Normal tympanograms were obtained bilaterally, consistent with a normal moving eardrums   Ipsilateral acoustic reflexes were not completed due to inability to maintain seal bilaterally.    AUDIOMETRIC TESTING  Pure tone audiometry conducted via insert headphones from 250 Hz - 8000 Hz with good reliability was consistent with:  Right ear: mild to moderate conductive hearing loss  Left ear: mild to severe mixed hearing loss    SPEECH RECOGNITION TESTING (SRT)  SRT was in agreement with pure tone averages bilaterally ( Right: 40 dB HL, Left: 35 dB HL).    WORD RECOGNITION SCORE (WRS)  WRS was (100 %) in the right ear and (90 %) in the left ear using recorded ordered by difficulty NU6 word list.    AIDED TESTING   Functional gain testing was completed with the BAHA 6 Max sound processor on a headband programmed to Nichole Boss hearing thresholds. Aided thresholds were obtained from 20 dB HL - 65 dB HL for the frequencies of 250-6000 Hz.     Testing completed at 0 degrees azimuth to the speaker and speech noise was present to the front Word recognition completed with recorded material at 50dB HL +10 SNR noise to the contralateral ear via the front speaker.  Right ear was plugged and muffed during testing    Baha 6 Max:  AIDED: CNC words at 50 dB HL= 92 %    AIDED: AzBIO sentences at 50 dB HL = 90 % with + 10  SNR  AIDED: AzBIO sentences at 50 dBHL = 94% in quiet    Patient's Left Hearing aid:  AIDED: CNC words at 50 dB HL= 58 %    AIDED: AzBIO sentences at 50 dB HL = 94 % with + 10 SNR    UNAIDED: CNC words at 50 dB HL= 48 %    UNAIDED: AzBIO sentences at 50 dB HL =  75 % with +10 SNR    IMPRESSION   Functional testing revealed excellent word and sentences recognition when noise is introduced to the better hearing ear. Testing completed with the BAHA coupled to the Left ear demonstrates improvement for sound detection and word and sentence understanding. This is a significant improvement as compared to unaided word and sentence recognition.     Discussed the advantages and limitations of the BRENT system including the percutaneous, transcutaneous, softband/SoundArc, ADHear and provided the patient with  information from [employer] and [employer]. Discussed general care and maintenance of having a BRENT system and the exhibits the ability to appropriately care for an abutment and implant location.  Realistic expectations were reviewed. Device demonstration was performed but device preferences were not finalized. Patient had several questions.            TREATMENT PLAN:    1. Continue medical follow-up with Dr. Saavedra for further consideration of an OSIA for the LEFT ear  2. Return after ENT surgical consultation for a BRENT consultation/device selection.   3. Annual hearing assessments.  4. Continue use of binaural hearing aids.  5. Contact clinic with questions or concerns at [phone].    Time: 4228-7836

## 2023-11-14 ENCOUNTER — TREATMENT (OUTPATIENT)
Dept: PHYSICAL THERAPY | Facility: CLINIC | Age: 68
End: 2023-11-14
Payer: MEDICARE

## 2023-11-14 DIAGNOSIS — N94.10 DYSPAREUNIA, FEMALE: ICD-10-CM

## 2023-11-14 DIAGNOSIS — N39.3 FEMALE STRESS INCONTINENCE: ICD-10-CM

## 2023-11-14 PROCEDURE — 97140 MANUAL THERAPY 1/> REGIONS: CPT | Mod: GP

## 2023-11-14 PROCEDURE — 97530 THERAPEUTIC ACTIVITIES: CPT | Mod: GP

## 2023-11-14 ASSESSMENT — PAIN SCALES - GENERAL: PAINLEVEL_OUTOF10: 0 - NO PAIN

## 2023-11-14 ASSESSMENT — PAIN - FUNCTIONAL ASSESSMENT: PAIN_FUNCTIONAL_ASSESSMENT: 0-10

## 2023-11-14 NOTE — PROGRESS NOTES
"Physical Therapy Treatment    Patient Name: Nichole Boss  MRN: [id number]  Today's Date: 11/14/2023  Time Calculation  Start Time: 0835  Stop Time: 0930  Time Calculation (min): 55 min  PT Therapeutic Procedures Time Entry  Manual Therapy Time Entry: 38  Therapeutic Activity Time Entry: 15  Visit # 18/24    Current Problem   1. Dyspareunia, female  Follow Up In Physical Therapy    Follow Up In Physical Therapy      2. Female stress incontinence  Follow Up In Physical Therapy          Subjective   General    Had cystoscopy with pyelogram on 11/3, normal results. She has follow up with urology, but not sure what is next. Per pt, they are thinking maybe she passed a kidney.  Yoga 1-2x/week. Still doing Pilates, and Anshul Chi as well. She is really enjoying that.   FABIANA only if she \"waits too long\" and pairs that with heavy cough. Nocturia x1 most nights.  She is managing constipation with oat bran mixture.    Precautions:  Precautions  STEADI Fall Risk Score (The score of 4 or more indicates an increased risk of falling): 0  Pain   Pain Assessment: 0-10  Pain Score: 0 - No pain  Post Treatment Pain Level 0    Objective   ++vaginal wall restriction R side  +tender L>R side bulbo, PC, OI, endopelvic fascia  Good release noted L side    Treatments:  Therapeutic Activity  Therapeutic Activity Performed: Yes  Therapeutic Activity 1: Reviewed recent symtpom updates. Educated pt re: impact of good bowel function on both bladder and sexual comfort. Reviewed fiber intake and issued handout for fiber rich foods to improve bowel function (pt still having 3-4 unsatisfying bm per day.)    Manual Therapy  Manual Therapy Performed: Yes  Manual Therapy Activity 1: Internal (vaginal) manual therapy in supine with pt consent: mfr (bimanual - thumb and forefinger) release of labia majora fascia and bulbocavernosus, gentle ttp OI, PC with variable hip position, mfr bilat endopelvic fascia with second hand over abdominal wall, " bilat      Assessment   Assessment:    Continued tension L side 3rd layer mm>R however,  R side vaginal wall still quite restricted. This is likely due to surgical scarring or hormonal changes post hysterectomy.    Plan:    Continue with manual therapy. Pt compliant with exercises. Assess impact of OhNut.    OP EDUCATION:   Monitor fiber intake to improve bowel function    Goals:   Active       PT Goals       demos L hip ER ROM improved to = R  (Progressing)       Start:  09/21/23    Expected End:  12/29/23            report 1-2/3 or less on Marinoff Dyspareunia scale (PARTIALLY ACHIEVED - continues to be 2/3 most times, but last experience was 1/3, depth of penetration is improved without abdominal wall pain)  (Progressing)       Start:  12/29/23    Expected End:  12/29/23            report improved ease of bowel evacution with stool/squatty potty and breath strategies to decrease BM per day to 2 or less (PARTIALLY ACHIEVED, reduced 5-6x/ day to 3-4x/day, a little easier, more soft/bulky)  (Progressing)       Start:  09/21/23    Expected End:  12/29/23

## 2023-11-21 ENCOUNTER — APPOINTMENT (OUTPATIENT)
Dept: PHYSICAL THERAPY | Facility: CLINIC | Age: 68
End: 2023-11-21
Payer: MEDICARE

## 2023-11-28 ENCOUNTER — TREATMENT (OUTPATIENT)
Dept: PHYSICAL THERAPY | Facility: CLINIC | Age: 68
End: 2023-11-28
Payer: MEDICARE

## 2023-11-28 DIAGNOSIS — N94.10 DYSPAREUNIA, FEMALE: ICD-10-CM

## 2023-11-28 DIAGNOSIS — N39.3 FEMALE STRESS INCONTINENCE: ICD-10-CM

## 2023-11-28 PROCEDURE — 97530 THERAPEUTIC ACTIVITIES: CPT | Mod: GP

## 2023-11-28 PROCEDURE — 97140 MANUAL THERAPY 1/> REGIONS: CPT | Mod: GP

## 2023-11-28 ASSESSMENT — PAIN - FUNCTIONAL ASSESSMENT: PAIN_FUNCTIONAL_ASSESSMENT: 0-10

## 2023-11-28 ASSESSMENT — PAIN SCALES - GENERAL: PAINLEVEL_OUTOF10: 0 - NO PAIN

## 2023-11-28 NOTE — PROGRESS NOTES
Physical Therapy Treatment    Patient Name: Nichole Boss  MRN: [id number]  Today's Date: 11/28/2023  Time Calculation  Start Time: 0840  Stop Time: 0930  Time Calculation (min): 50 min  PT Therapeutic Procedures Time Entry  Manual Therapy Time Entry: 35  Therapeutic Activity Time Entry: 10  Visit # 19/24    Current Problem   1. Dyspareunia, female  Follow Up In Physical Therapy      2. Female stress incontinence  Follow Up In Physical Therapy          Subjective   General    Got OhNut and it helps a little. Has used a few times, not as good as she hoped, but does help. Has been under a lot of stress looking for new home.     Precautions:  Precautions  STEADI Fall Risk Score (The score of 4 or more indicates an increased risk of falling): 0  Pain   Pain Assessment: 0-10  Pain Score: 0 - No pain  Post Treatment Pain Level 0    Objective   Internal assessment of pelvic floor muscles (vaginal) completed in supine position with patient consent.    Observation:  Vulvar tissue pink with minimal dryness    Internal:  Superficial layer mm   No significant tenderness this date    Deep layer:  +min tension R>L OI, tender bilat  +tender bilat ileococcygeus, PC  Notable restriction R side vaginal wall (improved from previous session    Muscle Excursion  Good lift, fair lengthen (improves with cues)    Strength  3/5    Prolapse  None observed     Treatments:     Therapeutic Activity  Therapeutic Activity Performed: Yes  Therapeutic Activity 1: Reviewed OhNut use. Pt and partner currently using 3 rings. Discussed very gradual reduction in rings, monitoring for increased symptoms. Per patient this works best when she is supine with partner over top.       Manual Therapy  Manual Therapy Performed: Yes  Manual Therapy Activity 1: Internal (vaginal) manual therapy with patient consent. Patient positioned in supine and in R/L sidelying for: MFR, STP/DTP, ttp and positional release R and L OI (pt acitvity moving through hip ER/IR ROM  and varying degrees of hip flexion/extension.) Release of ileococcygeus and PC with second hand over glute/sacrum for improved tissue slack and symtpom reduction.      Assessment   Assessment:    Overall, pelvic floor mm and vaginal wall tissue appears more supple and less painful than previous session. Pt is managing symptoms well overall with continued attention to stretching/ exercises, use of vaginal hormone topicals/tablets, use of Oh Nut. Pt continues to have varying degrees of dyspareunia, but improved overall.    Plan:    Continue every 2-4 weeks with manual therapy    OP EDUCATION:   Continue to use Oh Nut with gradual progression as noted.    Goals:   Active       PT Goals       demos L hip ER ROM improved to = R  (Progressing)       Start:  09/21/23    Expected End:  12/29/23            report 1-2/3 or less on Marinoff Dyspareunia scale (PARTIALLY ACHIEVED - continues to be 2/3 most times, but last experience was 1/3, depth of penetration is improved without abdominal wall pain)  (Progressing)       Start:  12/29/23    Expected End:  12/29/23            report improved ease of bowel evacution with stool/squatty potty and breath strategies to decrease BM per day to 2 or less (PARTIALLY ACHIEVED, reduced 5-6x/ day to 3-4x/day, a little easier, more soft/bulky)  (Progressing)       Start:  09/21/23    Expected End:  12/29/23

## 2023-12-05 ENCOUNTER — APPOINTMENT (OUTPATIENT)
Dept: PHYSICAL THERAPY | Facility: CLINIC | Age: 68
End: 2023-12-05
Payer: MEDICARE

## 2023-12-12 ENCOUNTER — TELEMEDICINE (OUTPATIENT)
Dept: OTOLARYNGOLOGY | Facility: CLINIC | Age: 68
End: 2023-12-12
Payer: MEDICARE

## 2023-12-12 ENCOUNTER — APPOINTMENT (OUTPATIENT)
Dept: PHYSICAL THERAPY | Facility: CLINIC | Age: 68
End: 2023-12-12
Payer: MEDICARE

## 2023-12-12 DIAGNOSIS — H90.6 MIXED HEARING LOSS, BILATERAL: ICD-10-CM

## 2023-12-12 DIAGNOSIS — H80.93 OTOSCLEROSIS OF BOTH EARS: Primary | ICD-10-CM

## 2023-12-12 PROCEDURE — 99212 OFFICE O/P EST SF 10 MIN: CPT | Performed by: STUDENT IN AN ORGANIZED HEALTH CARE EDUCATION/TRAINING PROGRAM

## 2023-12-12 RX ORDER — CICLOPIROX 80 MG/ML
SOLUTION TOPICAL
COMMUNITY
Start: 2023-11-14 | End: 2023-12-26 | Stop reason: WASHOUT

## 2023-12-12 ASSESSMENT — PATIENT HEALTH QUESTIONNAIRE - PHQ9
SUM OF ALL RESPONSES TO PHQ9 QUESTIONS 1 & 2: 0
2. FEELING DOWN, DEPRESSED OR HOPELESS: NOT AT ALL
1. LITTLE INTEREST OR PLEASURE IN DOING THINGS: NOT AT ALL

## 2023-12-13 NOTE — PROGRESS NOTES
This visit was performed virtually, and for this reason, physical exam was deferred.    CHIEF COMPLAINT:   Chief Complaint   Patient presents with    Follow-up     Virtual visit        HISTORY OF PRESENT ILLNESS from 9/12/2023: Ms. Boss is a 68-year-old woman with a history of bilateral otosclerosis. Her left ear has always been her worst ear, and it continues to be her worst ear to this day. She has total of 4 surgeries on the left ear, most recently at the [facility] with Dr. Mederos in 2009. She was told after that surgery that there was nothing else that could be done from a middle ear exploration standpoint on the left side. She has previously been told that there are no good options for the right ear given that this is her better hearing ear. She wears hearing aids in both of her ears. She reports that she does fairly well with the hearing aids, but is interested in any additional options.     Interval History: She underwent BAHA/osia evaluation for the left ear.  She reports that she didn't notice that much improvement over the hearing aid.      PAST MEDICAL HISTORY:   Past Medical History:   Diagnosis Date    Awareness under anesthesia     Bilateral hearing loss     Does not wear her hearing aids on a daily basis       PAST SURGICAL HISTORY:   Past Surgical History:   Procedure Laterality Date    HYSTERECTOMY      age 50    MYOMECTOMY      STAPEDECTOMY      x3       MEDICATIONS:   Current Outpatient Medications:     calcium carbonate-vitamin D3 (Calcium 600 with Vitamin D3) 600 mg-12.5 mcg (500 unit) capsule, Take 1 tablet by mouth 2 times a day. For 90 days, Disp: , Rfl:     cetirizine (ZyrTEC) 10 mg capsule, Take by mouth., Disp: , Rfl:     cholecalciferol (Vitamin D-3) 50 MCG (2000 UT) tablet, Take 1 tablet (50 mcg) by mouth once daily., Disp: , Rfl:     estradiol (Estrace) 0.01 % (0.1 mg/gram) vaginal cream, Insert 0.25 Applicatorfuls (1 g) into the vagina once daily at bedtime. 1 to 3 times  per week, Disp: , Rfl:     estrogens, conjugated, (Premarin) vaginal cream, Insert 0.5 g into the vagina 3 (three) times a week. COMPOUNDED WITH TESTOSTERONE PRESCRIBED BY DR. FALLON, Disp: , Rfl:     multivit-min/iron/FA/vit K/lut (CENTRUM SILVER WOMEN ORAL), Take 1 capsule by mouth once daily., Disp: , Rfl:     ciclopirox (Penlac) 8 % solution, Apply topically once daily., Disp: , Rfl:     estradiol (Vagifem) 10 mcg tablet vaginal tablet, Insert 1 tablet (10 mcg) into the vagina once daily at bedtime. 2x week, Disp: , Rfl:     loratadine (Claritin) 10 mg tablet, Take 1 tablet (10 mg) by mouth once daily. For 30 days, Disp: , Rfl:     tretinoin (Retinoic Acid) powder, As directed, Disp: , Rfl:     Current Facility-Administered Medications:     fluconazole (Diflucan) tablet 150 mg, 150 mg, oral, Once, Ger De Los Santos MD    ALLERGIES:   Allergies   Allergen Reactions    Wasp Venom Unknown       SOCIAL HISTORY:   reports that she has never smoked. She has never used smokeless tobacco. She reports current alcohol use of about 7.0 standard drinks of alcohol per week. She reports that she does not use drugs.    FAMILY HISTORY: family history includes Heart disease in her mother; Leukemia in her brother; Lymphoma in her father; Parkinsonism in her brother.    DATA REVIEWED:  Audiogram  I reviewed the audiogram from 9/11/2023, which demonstrates right moderate rising to normal conductive hearing loss and left moderate rising to mild and sloping to profound mixed hearing loss. There is type a tympanograms bilaterally. Word recognition score is 100% on the right and 90% on the left.     BAHA evaluation:  Baha 6 Max:  AIDED: CNC words at 50 dB HL= 92 %    AIDED: AzBIO sentences at 50 dB HL = 90 % with + 10 SNR  AIDED: AzBIO sentences at 50 dBHL = 94% in quiet     Patient's Left Hearing aid:  AIDED: CNC words at 50 dB HL= 58 %    AIDED: AzBIO sentences at 50 dB HL = 94 % with + 10 SNR     UNAIDED: CNC words at 50 dB HL= 48 %     UNAIDED: AzBIO sentences at 50 dB HL =  75 % with +10 SNR    Impression:  Right conductive hearing loss  Left mixed hearing loss  Bilateral otosclerosis     Recommendation:  I discussed the patient's hearing options. I discussed that given that she has had 4 operations on the left side, I likely would not recommend revision stapedotomy or stapedectomy as the risk of hearing loss is greater with each subsequent attempt. I also discussed that given that her right ear is her better hearing ear, I likely would also not recommend middle ear exploration on the right side with a stapedotomy. I did discuss other hearing rehabilitation options other than hearing aids, which she is already using. 1 option is bone-conduction devices. I discussed Baha, but I also discussed osia as a potential option. She could have an osia on either side, though I would probably recommended on the left side as this would give her better hearing on that left side of her head. I discussed the risk, benefits of Osia implantation, including, not limited to, bleeding, pain, infection, implant malfunction, implant extrusion, need for further procedures. She wants to continue to consider left osia implantation.  She would like to discuss the financial implications of osia implantation.  We will also schedule a virtual visit in 4-6 weeks to discuss possible implantation.    Jaguar Saavedra MD

## 2023-12-23 DIAGNOSIS — N94.10 DYSPAREUNIA, FEMALE: Primary | ICD-10-CM

## 2023-12-26 ENCOUNTER — TELEMEDICINE (OUTPATIENT)
Dept: PRIMARY CARE | Facility: CLINIC | Age: 68
End: 2023-12-26
Payer: MEDICARE

## 2023-12-26 DIAGNOSIS — U07.1 COVID-19: Primary | ICD-10-CM

## 2023-12-26 PROCEDURE — 99442 PR PHYS/QHP TELEPHONE EVALUATION 11-20 MIN: CPT | Performed by: NURSE PRACTITIONER

## 2023-12-26 ASSESSMENT — PATIENT HEALTH QUESTIONNAIRE - PHQ9
2. FEELING DOWN, DEPRESSED OR HOPELESS: NOT AT ALL
1. LITTLE INTEREST OR PLEASURE IN DOING THINGS: NOT AT ALL
SUM OF ALL RESPONSES TO PHQ9 QUESTIONS 1 AND 2: 0

## 2023-12-26 ASSESSMENT — PAIN SCALES - GENERAL: PAINLEVEL: 6

## 2023-12-26 NOTE — PROGRESS NOTES
With patient's permission this is a telemedicine visit with video and audio.  History Of Present Illness  Nichole Boss is a 68 y.o. female who calls in for Cough (Dr SANTILLAN [phone] - COVID + last night - congestion and cough, sore throat, runny nose, body aches, headache, nausea since yesterday afternoon/Pt tried nyquil, tylenol).    HPI This is a 68 year old female who tested positive for covid last night.  Fevers chills body aches cough congestion and nausea sx started yesterday.  No SOB     Past Medical History  She has a past medical history of Awareness under anesthesia and Bilateral hearing loss.    She has no past medical history of Delayed emergence from general anesthesia, Diabetes mellitus (CMS/HCC), Hard to intubate, Malignant hyperthermia, PONV (postoperative nausea and vomiting), Refusal of blood product, or Sleep apnea.    Medications  Current Outpatient Medications   Medication Instructions    calcium carbonate-vitamin D3 (Calcium 600 with Vitamin D3) 600 mg-12.5 mcg (500 unit) capsule 1 tablet, oral, 2 times daily, For 90 days    cetirizine (ZyrTEC) 10 mg capsule oral    estradiol (ESTRACE) 1 g, vaginal, Nightly, 1 to 3 times per week<BR>    estradiol (Vagifem) 10 mcg tablet vaginal tablet 1 tablet, vaginal, Nightly, 2x week    multivit-min/iron/FA/vit K/lut (CENTRUM SILVER WOMEN ORAL) 1 capsule, oral, Daily    tretinoin (Retinoic Acid) powder As directed        Surgical History  She has a past surgical history that includes Hysterectomy; Stapedectomy; and Myomectomy.     Social History  She reports that she has never smoked. She has never used smokeless tobacco. She reports current alcohol use of about 7.0 standard drinks of alcohol per week. She reports that she does not use drugs.    Family History  Family History   Problem Relation Name Age of Onset    Heart disease Mother      Lymphoma Father 93     Leukemia Brother      Parkinsonism Brother          Allergies  Wasp venom    On video:      Appearance: Normal appearance.      Effort: Respiratory effort is normal. Speaking in full sentences. No respiratory distress.     Mood and Affect: Mood normal.         Thought Content: Thought content normal.         Judgment: Judgment normal.      Last Recorded Vitals  There were no vitals taken for this visit.  (Vitals are taken by patient at home, if any reported)    Relevant Results      Assessment/Plan   There are no diagnoses linked to this encounter.        Amisha Carreon, APRN-CNP

## 2024-01-08 ENCOUNTER — TELEPHONE (OUTPATIENT)
Dept: PRIMARY CARE | Facility: CLINIC | Age: 69
End: 2024-01-08
Payer: MEDICARE

## 2024-01-09 ENCOUNTER — TREATMENT (OUTPATIENT)
Dept: PHYSICAL THERAPY | Facility: CLINIC | Age: 69
End: 2024-01-09
Payer: MEDICARE

## 2024-01-09 DIAGNOSIS — N94.10 DYSPAREUNIA, FEMALE: ICD-10-CM

## 2024-01-09 DIAGNOSIS — N39.3 FEMALE STRESS INCONTINENCE: Primary | ICD-10-CM

## 2024-01-09 PROCEDURE — 97140 MANUAL THERAPY 1/> REGIONS: CPT | Mod: GP

## 2024-01-09 ASSESSMENT — PAIN SCALES - GENERAL: PAINLEVEL_OUTOF10: 0 - NO PAIN

## 2024-01-09 ASSESSMENT — PAIN - FUNCTIONAL ASSESSMENT: PAIN_FUNCTIONAL_ASSESSMENT: 0-10

## 2024-01-09 NOTE — PROGRESS NOTES
Physical Therapy Treatment    Patient Name: Nichole Boss  MRN: [id number]  Today's Date: 1/9/2024  Time Calculation  Start Time: 0935  Stop Time: 1022  Time Calculation (min): 47 min  PT Therapeutic Procedures Time Entry  Manual Therapy Time Entry: 40  Therapeutic Activity Time Entry: 5  Visit # 20/24    Current Problem   1. Female stress incontinence        2. Dyspareunia, female  Follow Up In Physical Therapy          Subjective   General    She got Covid on [place] Day. About a week later, she got a self-diagnosed yeast infection. Treated herself with OTC fluconazole.   Dyspareunia has been holding steady since here last. Using Ohnut sometimes, sometimes not. Has experimented with different positions and angles and this seems to help.     Precautions:  Precautions  STEADI Fall Risk Score (The score of 4 or more indicates an increased risk of falling): 0  Pain   Pain Assessment: 0-10  Pain Score: 0 - No pain  Post Treatment Pain Level 0    Objective   Mild overactivity noted L side LA. Tissue restriction R side vaginal wall with area of narrowing (as noted previously). Vulvar and vaginal tissue throughout without notable dryness and overall supple. Some areas of increased tenderness L>R bulbocav and LA especially.    Treatments:   Therapeutic Activity  Therapeutic Activity Performed: Yes  Therapeutic Activity 1: Review of topical compounded hormone cream use, use of lubricant with all intimate encounters (using intimate jenny lubricant), exercises, use of ohnut    Manual Therapy  Manual Therapy Performed: No  Manual Therapy Activity 1: Internal (vaginal) manual therapy in [facility] with patient consent: R and L side STP, bulbo and periurethral tissue release with bimanual (thumb and forefinger and second hand over abdomen for tissue slacking.) Sustained pressure tender point release over LA with SCS and movement: pelvic tilt, KTC, hip ER in full hip flexion.      Assessment   Assessment:    Combination of  movement/exercise, manual therapy, topical hormones and position modifications are having positive impact on dyspareunia. It is not abolished, but improved overall. Progress is slowing, but still present. Patient responds well to manual therapy.    Plan:    Review exercises, continue with manual therapy.     OP EDUCATION:   Continue with exercises and modifications with position and use of ohnut as needed    Goals:   Active       PT Goals       demos L hip ER ROM improved to = R  (Progressing)       Start:  09/21/23    Expected End:  12/29/23            report 1-2/3 or less on Marinoff Dyspareunia scale (PARTIALLY ACHIEVED - continues to be 2/3 most times, but last experience was 1/3, depth of penetration is improved without abdominal wall pain)  (Progressing)       Start:  12/29/23    Expected End:  12/29/23            report improved ease of bowel evacution with stool/squatty potty and breath strategies to decrease BM per day to 2 or less (PARTIALLY ACHIEVED, reduced 5-6x/ day to 3-4x/day, a little easier, more soft/bulky)  (Progressing)       Start:  09/21/23    Expected End:  12/29/23

## 2024-01-10 ENCOUNTER — TELEPHONE (OUTPATIENT)
Dept: PRIMARY CARE | Facility: CLINIC | Age: 69
End: 2024-01-10
Payer: MEDICARE

## 2024-01-10 NOTE — TELEPHONE ENCOUNTER
Pt lvm stating she was referred to podiatry but was out of network. She has found an in network physician Doug Lyles  fax [phone] pt is requesting that the referral be faxed over to Dr. Lyles's office [id number] [facility].

## 2024-01-11 ENCOUNTER — TELEPHONE (OUTPATIENT)
Dept: PRIMARY CARE | Facility: CLINIC | Age: 69
End: 2024-01-11
Payer: MEDICARE

## 2024-01-11 DIAGNOSIS — Z91.89 AT RISK FOR FOOT PROBLEM: Primary | ICD-10-CM

## 2024-01-11 NOTE — TELEPHONE ENCOUNTER
Pt called requesting an referral for podiatry, pt wants referral to see Dr. Doug Lyles in [facility].

## 2024-01-17 ENCOUNTER — LAB (OUTPATIENT)
Dept: LAB | Facility: LAB | Age: 69
End: 2024-01-17
Payer: MEDICARE

## 2024-01-17 DIAGNOSIS — Z79.899 OTHER LONG TERM (CURRENT) DRUG THERAPY: Primary | ICD-10-CM

## 2024-01-17 LAB
ALBUMIN SERPL-MCNC: 4.4 G/DL (ref 3.5–5)
ALP BLD-CCNC: 79 U/L (ref 35–125)
ALT SERPL-CCNC: 19 U/L (ref 5–40)
AST SERPL-CCNC: 20 U/L (ref 5–40)
BILIRUB DIRECT SERPL-MCNC: <0.2 MG/DL (ref 0–0.2)
BILIRUB SERPL-MCNC: 0.3 MG/DL (ref 0.1–1.2)
PROT SERPL-MCNC: 6.3 G/DL (ref 5.9–7.9)

## 2024-01-17 PROCEDURE — 80076 HEPATIC FUNCTION PANEL: CPT

## 2024-01-17 PROCEDURE — 36415 COLL VENOUS BLD VENIPUNCTURE: CPT

## 2024-01-30 ENCOUNTER — TREATMENT (OUTPATIENT)
Dept: PHYSICAL THERAPY | Facility: CLINIC | Age: 69
End: 2024-01-30
Payer: MEDICARE

## 2024-01-30 DIAGNOSIS — N94.10 DYSPAREUNIA, FEMALE: ICD-10-CM

## 2024-01-30 DIAGNOSIS — N39.3 FEMALE STRESS INCONTINENCE: Primary | ICD-10-CM

## 2024-01-30 PROCEDURE — 97140 MANUAL THERAPY 1/> REGIONS: CPT | Mod: GP

## 2024-01-30 PROCEDURE — 97112 NEUROMUSCULAR REEDUCATION: CPT | Mod: GP

## 2024-01-30 PROCEDURE — 97530 THERAPEUTIC ACTIVITIES: CPT | Mod: GP

## 2024-01-30 ASSESSMENT — PAIN - FUNCTIONAL ASSESSMENT: PAIN_FUNCTIONAL_ASSESSMENT: 0-10

## 2024-01-30 ASSESSMENT — PAIN SCALES - GENERAL: PAINLEVEL_OUTOF10: 0 - NO PAIN

## 2024-01-30 NOTE — PROGRESS NOTES
Physical Therapy Treatment    Patient Name: Nichole Boss  MRN: [id number]  Today's Date: 1/30/2024  Time Calculation  Start Time: 1505  Stop Time: 1605  Time Calculation (min): 60 min  PT Therapeutic Procedures Time Entry  Manual Therapy Time Entry: 30  Neuromuscular Re-Education Time Entry: 10  Therapeutic Activity Time Entry: 15  Visit # 21/24    Current Problem   1. Female stress incontinence        2. Dyspareunia, female  Follow Up In Physical Therapy          Subjective   General    Continues to do [employer] and therapy exercises. Also doing weight class, yoga, pilates, hike.   Intimacy has been better. Some times great, others not so greath.  Constipation better as long as she has plenty of fiber. She is keeping track with Noom.  FABIANA with coughing very occasionally only with hard cough, laugh, sneeze. Very little amount and very infrequent.     Precautions:  Precautions  STEADI Fall Risk Score (The score of 4 or more indicates an increased risk of falling): 0  Pain   Pain Assessment: 0-10  Pain Score: 0 - No pain  Post Treatment Pain Level 0    Objective   Internal pelvic floor mm (vaginal) with patient consent in hooklying:  +tender post aspect of vaginal wall and vaginal cuff  +tissue restriction noted R side vagina  +minimal mm tenderness LA R>L  Strength 3+/5 with slight delay in relaxation  Quick flicks=10    Treatments:       Therapeutic Activity  Therapeutic Activity Performed: Yes  Therapeutic Activity 1: Reviewed positions for intimacy and discussed possible reasons why some experiences may be better than others (position, hydration, lubrication, partner engorgement level, constipation, etc.)  Therapeutic Activity 2: Educated patient in reasons why incorporating pfm contraction/isolation daily with focus on relaxation after contraction may be beneficial in managing FABIANA    Balance/Neuromuscular Re-Education  Balance/Neuromuscular Re-Education Activity Performed: Yes  Balance/Neuromuscular Re-Education  Activity 1: During internal: educated patient in pelvic floor mm contraction in coordination with exhale and with focus to differentiate/focus on anterior vs posterior aspect of pfm (nod the clitoris cue vs. squeeze the rectum cue)    Manual Therapy  Manual Therapy Performed: Yes  Manual Therapy Activity 1: Internal (vaginal) mm release of pelvic floor mm in hooklying: gentle sustained pressure LA and OI R and L side with variable hip position for tissue slacking. Bimanual release with second hand over glute. Gentle tissue mobility post vaginal wall, DTP.    Assessment   Assessment:    Patient demos improved tissue mobility of vaginal wall and decreased pain during vaginal penetration. Patient reports constipation improved with fiber management. She is very active without pain on a daily basis. Patient and PT agree that she is nearing discharge.    Plan:    Will plan to discharge next visit. Assess all goals.    OP EDUCATION:   Add pfm contractions slow x10, fast x10 daily.    Goals:   Active       PT Goals       demos L hip ER ROM improved to = R  (Progressing)       Start:  09/21/23    Expected End:  02/20/24            report 1-2/3 or less on Marinoff Dyspareunia scale (PARTIALLY ACHIEVED - continues to be 2/3 most times, but last experience was 1/3, depth of penetration is improved without abdominal wall pain)  (Progressing)       Start:  12/29/23    Expected End:  02/20/24            report improved ease of bowel evacution with stool/squatty potty and breath strategies to decrease BM per day to 2 or less (PARTIALLY ACHIEVED, reduced 5-6x/ day to 3-4x/day, a little easier, more soft/bulky)  (Progressing)       Start:  09/21/23    Expected End:  02/20/24

## 2024-02-01 ENCOUNTER — TELEMEDICINE (OUTPATIENT)
Dept: OTOLARYNGOLOGY | Facility: CLINIC | Age: 69
End: 2024-02-01
Payer: MEDICARE

## 2024-02-01 DIAGNOSIS — H90.6 MIXED HEARING LOSS, BILATERAL: Primary | ICD-10-CM

## 2024-02-01 PROCEDURE — 99212 OFFICE O/P EST SF 10 MIN: CPT | Performed by: STUDENT IN AN ORGANIZED HEALTH CARE EDUCATION/TRAINING PROGRAM

## 2024-02-01 PROCEDURE — 99212 OFFICE O/P EST SF 10 MIN: CPT | Mod: 95 | Performed by: STUDENT IN AN ORGANIZED HEALTH CARE EDUCATION/TRAINING PROGRAM

## 2024-02-01 RX ORDER — TERBINAFINE HYDROCHLORIDE 250 MG/1
250 TABLET ORAL
COMMUNITY
Start: 2024-01-12 | End: 2024-04-05

## 2024-02-01 SDOH — ECONOMIC STABILITY: FOOD INSECURITY: WITHIN THE PAST 12 MONTHS, YOU WORRIED THAT YOUR FOOD WOULD RUN OUT BEFORE YOU GOT MONEY TO BUY MORE.: NEVER TRUE

## 2024-02-01 SDOH — ECONOMIC STABILITY: FOOD INSECURITY: WITHIN THE PAST 12 MONTHS, THE FOOD YOU BOUGHT JUST DIDN'T LAST AND YOU DIDN'T HAVE MONEY TO GET MORE.: NEVER TRUE

## 2024-02-01 ASSESSMENT — PAIN SCALES - GENERAL: PAINLEVEL: 0-NO PAIN

## 2024-02-02 NOTE — PROGRESS NOTES
This visit was performed virtually, and for this reason, physical exam was deferred.    CHIEF COMPLAINT:   Chief Complaint   Patient presents with    Follow-up     Consult        HISTORY OF PRESENT ILLNESS from 9/12/2023: Ms. Boss is a 68-year-old woman with a history of bilateral otosclerosis. Her left ear has always been her worst ear, and it continues to be her worst ear to this day. She has total of 4 surgeries on the left ear, most recently at the [facility] [place] with Dr. Mederos in 2009. She was told after that surgery that there was nothing else that could be done from a middle ear exploration standpoint on the left side. She has previously been told that there are no good options for the right ear given that this is her better hearing ear. She wears hearing aids in both of her ears. She reports that she does fairly well with the hearing aids, but is interested in any additional options.     Interval History: She underwent BAHA/osia evaluation for the left ear.  She reports that she didn't notice that much improvement over the hearing aid.      PAST MEDICAL HISTORY:   Past Medical History:   Diagnosis Date    Awareness under anesthesia     Bilateral hearing loss     Does not wear her hearing aids on a daily basis       PAST SURGICAL HISTORY:   Past Surgical History:   Procedure Laterality Date    HYSTERECTOMY      age 50    MYOMECTOMY      STAPEDECTOMY      x3       MEDICATIONS:   Current Outpatient Medications:     calcium carbonate-vitamin D3 (Calcium 600 with Vitamin D3) 600 mg-12.5 mcg (500 unit) capsule, Take 1 tablet by mouth 2 times a day. For 90 days, Disp: , Rfl:     cetirizine (ZyrTEC) 10 mg capsule, Take by mouth., Disp: , Rfl:     estradiol (Estrace) 0.01 % (0.1 mg/gram) vaginal cream, Insert 0.25 Applicatorfuls (1 g) into the vagina once daily at bedtime. 1 to 3 times per week, Disp: , Rfl:     estradiol (Vagifem) 10 mcg tablet vaginal tablet, Insert 1 tablet (10 mcg) into the vagina once  daily at bedtime. 2x week, Disp: , Rfl:     multivit-min/iron/FA/vit K/lut (CENTRUM SILVER WOMEN ORAL), Take 1 capsule by mouth once daily., Disp: , Rfl:     terbinafine (LamISIL) 250 mg tablet, Take 1 tablet (250 mg) by mouth once daily., Disp: , Rfl:     tretinoin (Retinoic Acid) powder, As directed, Disp: , Rfl:     Current Facility-Administered Medications:     fluconazole (Diflucan) tablet 150 mg, 150 mg, oral, Once, Ger De Los Santos MD    ALLERGIES:   Allergies   Allergen Reactions    Wasp Venom Unknown       SOCIAL HISTORY:   reports that she has never smoked. She has never used smokeless tobacco. She reports current alcohol use of about 7.0 standard drinks of alcohol per week. She reports that she does not use drugs.    FAMILY HISTORY: family history includes Heart disease in her mother; Leukemia in her brother; Lymphoma in her father; Parkinsonism in her brother.    DATA REVIEWED:  Audiogram  I reviewed the audiogram from 9/11/2023, which demonstrates right moderate rising to normal conductive hearing loss and left moderate rising to mild and sloping to profound mixed hearing loss. There is type a tympanograms bilaterally. Word recognition score is 100% on the right and 90% on the left.     BAHA evaluation:  Baha 6 Max:  AIDED: CNC words at 50 dB HL= 92 %    AIDED: AzBIO sentences at 50 dB HL = 90 % with + 10 SNR  AIDED: AzBIO sentences at 50 dBHL = 94% in quiet     Patient's Left Hearing aid:  AIDED: CNC words at 50 dB HL= 58 %    AIDED: AzBIO sentences at 50 dB HL = 94 % with + 10 SNR     UNAIDED: CNC words at 50 dB HL= 48 %    UNAIDED: AzBIO sentences at 50 dB HL =  75 % with +10 SNR    Impression:  Right conductive hearing loss  Left mixed hearing loss  Bilateral otosclerosis     Recommendation:  I discussed the patient's hearing options. I discussed that given that she has had 4 operations on the left side, I likely would not recommend revision stapedotomy or stapedectomy as the risk of hearing loss  is greater with each subsequent attempt. I also discussed that given that her right ear is her better hearing ear, I likely would also not recommend middle ear exploration on the right side with a stapedotomy. I did discuss other hearing rehabilitation options other than hearing aids, which she is already using. 1 option is bone-conduction devices. I discussed Baha, but I also discussed osia as a potential option. She could have an osia on either side, though I would probably recommended on the left side as this would give her better hearing on that left side of her head. I discussed the risk, benefits of Osia implantation, including, not limited to, bleeding, pain, infection, implant malfunction, implant extrusion, need for further procedures. She would like to pursue left osia implantation.  We will plan for left osia.    Jaguar Saavedra MD

## 2024-02-05 ENCOUNTER — TELEPHONE (OUTPATIENT)
Dept: OTOLARYNGOLOGY | Facility: CLINIC | Age: 69
End: 2024-02-05
Payer: MEDICARE

## 2024-02-05 NOTE — TELEPHONE ENCOUNTER
Spoke with patient regarding available OR dates for the pending left OSIA procedure. Patient would like to consider dates offered and contact me in 1-2 days.

## 2024-02-16 ENCOUNTER — LAB (OUTPATIENT)
Dept: LAB | Facility: LAB | Age: 69
End: 2024-02-16
Payer: MEDICARE

## 2024-02-16 DIAGNOSIS — Z79.899 OTHER LONG TERM (CURRENT) DRUG THERAPY: Primary | ICD-10-CM

## 2024-02-16 LAB
ALBUMIN SERPL-MCNC: 4.5 G/DL (ref 3.5–5)
ALP BLD-CCNC: 75 U/L (ref 35–125)
ALT SERPL-CCNC: 19 U/L (ref 5–40)
AST SERPL-CCNC: 20 U/L (ref 5–40)
BILIRUB DIRECT SERPL-MCNC: <0.2 MG/DL (ref 0–0.2)
BILIRUB SERPL-MCNC: 0.4 MG/DL (ref 0.1–1.2)
PROT SERPL-MCNC: 6.6 G/DL (ref 5.9–7.9)

## 2024-02-16 PROCEDURE — 36415 COLL VENOUS BLD VENIPUNCTURE: CPT

## 2024-02-16 PROCEDURE — 80076 HEPATIC FUNCTION PANEL: CPT

## 2024-02-20 ENCOUNTER — TREATMENT (OUTPATIENT)
Dept: PHYSICAL THERAPY | Facility: CLINIC | Age: 69
End: 2024-02-20
Payer: MEDICARE

## 2024-02-20 DIAGNOSIS — N39.3 FEMALE STRESS INCONTINENCE: Primary | ICD-10-CM

## 2024-02-20 DIAGNOSIS — N94.10 DYSPAREUNIA, FEMALE: ICD-10-CM

## 2024-02-20 PROCEDURE — 97530 THERAPEUTIC ACTIVITIES: CPT | Mod: GP

## 2024-02-20 PROCEDURE — 97110 THERAPEUTIC EXERCISES: CPT | Mod: GP

## 2024-02-20 PROCEDURE — 97140 MANUAL THERAPY 1/> REGIONS: CPT | Mod: GP

## 2024-02-20 ASSESSMENT — PAIN - FUNCTIONAL ASSESSMENT: PAIN_FUNCTIONAL_ASSESSMENT: 0-10

## 2024-02-20 ASSESSMENT — PAIN SCALES - GENERAL: PAINLEVEL_OUTOF10: 0 - NO PAIN

## 2024-02-20 NOTE — PROGRESS NOTES
Physical Therapy Treatment    Patient Name: Nichole Boss  MRN: [id number]  Today's Date: 2/20/2024  Time Calculation  Start Time: 1030  Stop Time: 1130  Time Calculation (min): 60 min  PT Therapeutic Procedures Time Entry  Manual Therapy Time Entry: 30  Therapeutic Exercise Time Entry: 5  Therapeutic Activity Time Entry: 20  Visit # 22/24    Current Problem   1. Female stress incontinence        2. Dyspareunia, female  Follow Up In Physical Therapy          Subjective   General    Doing well. Battling some foot problems. Not doing as much yoga. She is buying/selling a home and feeling some tension in pfm and abdominal wall. Tried using pelvic want and it was uncomfortable. She still has good and bad experiences with intercourse. Uses OhNut as needed.    Precautions  Precautions  STEADI Fall Risk Score (The score of 4 or more indicates an increased risk of falling): 0  Pain   Pain Assessment: 0-10  Pain Score: 0 - No pain  Post Treatment Pain Level 0    Objective   Hip ER AROM R=70, L=40    Internal pelvic floor mm (vaginal) with patient consent in hooklying:  +min tender post aspect of vaginal wall and vaginal cuff  +tissue restriction noted R side vaginal wall  +minimal mm tenderness LA R>L  Strength 3+/5 with slight delay in relaxation  Minimal lengthening excursion appreciated  Pain <3/10 throughout treatment with moderate pressure    Treatments:  Therapeutic Exercise  Therapeutic Exercise Performed: Yes  Therapeutic Exercise Activity 1: Instructed/ performed/issued long sit figure 4 stretch for hip ER    Therapeutic Activity  Therapeutic Activity Performed: Yes  Therapeutic Activity 1: Assessed all goals and discussed patient plan moving forward.  Therapeutic Activity 2: Reviewed impacts of constipation on dyspareunia  Therapeutic Activity 3: Reviewed bowel emptying strategies/ fiber benefits; suggested she may try fiber gummies to increase intake  Therapeutic Activity 4: Reviewed pelvic wand use     Manual  Therapy  Manual Therapy Performed: Yes  Manual Therapy Activity 1: Internal vaginal PFM work (hooklying with patient consent_:  Internal (vaginal) mm release of pelvic floor mm in hooklying: gentle sustained pressure LA and OI R and L side with variable hip position for tissue slacking. Bimanual release with second hand over glute and over abdominal wall for endopelvic fascia release.    Assessment   Assessment:    Goals are nearly achieved, though patient does still struggle with constipation, and intermittent dyspareunia. She reports Marinoff 1/3 most of the time, but sometimes 2/3. Patient is managing well with home exercises, pelvic wand and OhNut use. She continues to use topical hormone compounds as well.    Plan:    Discharge at this time.    OP EDUCATION:   See above    Goals:   Active       PT Goals       demos L hip ER ROM improved to = R  (Progressing)       Start:  09/21/23    Expected End:  02/20/24            report 1-2/3 or less on Marinoff Dyspareunia scale (PARTIALLY ACHIEVED - continues to be 2/3 most times, but last experience was 1/3, depth of penetration is improved without abdominal wall pain)  (Met)       Start:  12/29/23    Expected End:  02/20/24    Resolved:  02/20/24         report improved ease of bowel evacution with stool/squatty potty and breath strategies to decrease BM per day to 2 or less (PARTIALLY ACHIEVED, reduced 5-6x/ day to 3-4x/day, a little easier, more soft/bulky)  (Progressing)       Start:  09/21/23    Expected End:  02/20/24

## 2024-03-20 ENCOUNTER — LAB (OUTPATIENT)
Dept: LAB | Facility: LAB | Age: 69
End: 2024-03-20
Payer: MEDICARE

## 2024-03-20 DIAGNOSIS — Z79.899 OTHER LONG TERM (CURRENT) DRUG THERAPY: ICD-10-CM

## 2024-03-20 DIAGNOSIS — B35.1 TINEA UNGUIUM: Primary | ICD-10-CM

## 2024-03-20 LAB
ALBUMIN SERPL-MCNC: 4.3 G/DL (ref 3.5–5)
ALP BLD-CCNC: 80 U/L (ref 35–125)
ALT SERPL-CCNC: 26 U/L (ref 5–40)
AST SERPL-CCNC: 22 U/L (ref 5–40)
BILIRUB DIRECT SERPL-MCNC: <0.2 MG/DL (ref 0–0.2)
BILIRUB SERPL-MCNC: <0.2 MG/DL (ref 0.1–1.2)
PROT SERPL-MCNC: 6.3 G/DL (ref 5.9–7.9)

## 2024-03-20 PROCEDURE — 80076 HEPATIC FUNCTION PANEL: CPT

## 2024-03-20 PROCEDURE — 36415 COLL VENOUS BLD VENIPUNCTURE: CPT

## 2024-04-24 ENCOUNTER — OFFICE VISIT (OUTPATIENT)
Dept: OBSTETRICS AND GYNECOLOGY | Facility: CLINIC | Age: 69
End: 2024-04-24
Payer: MEDICARE

## 2024-04-24 VITALS
WEIGHT: 126.6 LBS | HEIGHT: 64 IN | DIASTOLIC BLOOD PRESSURE: 72 MMHG | SYSTOLIC BLOOD PRESSURE: 118 MMHG | BODY MASS INDEX: 21.61 KG/M2

## 2024-04-24 DIAGNOSIS — N76.3 SUBACUTE VULVITIS: ICD-10-CM

## 2024-04-24 DIAGNOSIS — Z01.419 WELL WOMAN EXAM: Primary | ICD-10-CM

## 2024-04-24 DIAGNOSIS — Z79.890 HORMONE REPLACEMENT THERAPY (POSTMENOPAUSAL): ICD-10-CM

## 2024-04-24 DIAGNOSIS — Z12.31 ENCOUNTER FOR SCREENING MAMMOGRAM FOR MALIGNANT NEOPLASM OF BREAST: ICD-10-CM

## 2024-04-24 DIAGNOSIS — N95.8 GENITOURINARY SYNDROME OF MENOPAUSE: ICD-10-CM

## 2024-04-24 PROCEDURE — 1159F MED LIST DOCD IN RCRD: CPT | Performed by: OBSTETRICS & GYNECOLOGY

## 2024-04-24 PROCEDURE — 99397 PER PM REEVAL EST PAT 65+ YR: CPT | Performed by: OBSTETRICS & GYNECOLOGY

## 2024-04-24 PROCEDURE — 1126F AMNT PAIN NOTED NONE PRSNT: CPT | Performed by: OBSTETRICS & GYNECOLOGY

## 2024-04-24 RX ORDER — ESTRADIOL 10 UG/1
INSERT VAGINAL
COMMUNITY
Start: 2024-04-24 | End: 2024-04-25

## 2024-04-24 RX ORDER — KETOCONAZOLE 20 MG/G
1 CREAM TOPICAL DAILY
COMMUNITY
Start: 2024-01-12

## 2024-04-24 RX ORDER — ESTRADIOL 0.1 MG/G
CREAM VAGINAL
Qty: 42.5 G | Refills: 3 | Status: SHIPPED | OUTPATIENT
Start: 2024-04-24

## 2024-04-24 RX ORDER — CICLOPIROX 80 MG/ML
SOLUTION TOPICAL NIGHTLY
COMMUNITY

## 2024-04-24 RX ORDER — TERBINAFINE HYDROCHLORIDE 250 MG/1
250 TABLET ORAL DAILY
COMMUNITY

## 2024-04-24 ASSESSMENT — PAIN SCALES - GENERAL: PAINLEVEL: 0-NO PAIN

## 2024-04-24 ASSESSMENT — ENCOUNTER SYMPTOMS
DEPRESSION: 0
LOSS OF SENSATION IN FEET: 0
OCCASIONAL FEELINGS OF UNSTEADINESS: 0

## 2024-04-24 ASSESSMENT — LIFESTYLE VARIABLES
HOW OFTEN DO YOU HAVE SIX OR MORE DRINKS ON ONE OCCASION: NEVER
SKIP TO QUESTIONS 9-10: 1
AUDIT-C TOTAL SCORE: 3
HOW MANY STANDARD DRINKS CONTAINING ALCOHOL DO YOU HAVE ON A TYPICAL DAY: 1 OR 2
HOW OFTEN DO YOU HAVE A DRINK CONTAINING ALCOHOL: 2-3 TIMES A WEEK

## 2024-04-24 ASSESSMENT — PATIENT HEALTH QUESTIONNAIRE - PHQ9
1. LITTLE INTEREST OR PLEASURE IN DOING THINGS: NOT AT ALL
SUM OF ALL RESPONSES TO PHQ9 QUESTIONS 1 & 2: 0
2. FEELING DOWN, DEPRESSED OR HOPELESS: NOT AT ALL

## 2024-04-24 ASSESSMENT — SOCIAL DETERMINANTS OF HEALTH (SDOH)
WITHIN THE LAST YEAR, HAVE TO BEEN RAPED OR FORCED TO HAVE ANY KIND OF SEXUAL ACTIVITY BY YOUR PARTNER OR EX-PARTNER?: NO
WITHIN THE LAST YEAR, HAVE YOU BEEN KICKED, HIT, SLAPPED, OR OTHERWISE PHYSICALLY HURT BY YOUR PARTNER OR EX-PARTNER?: NO
WITHIN THE LAST YEAR, HAVE YOU BEEN AFRAID OF YOUR PARTNER OR EX-PARTNER?: NO
WITHIN THE LAST YEAR, HAVE YOU BEEN HUMILIATED OR EMOTIONALLY ABUSED IN OTHER WAYS BY YOUR PARTNER OR EX-PARTNER?: NO

## 2024-04-24 NOTE — PROGRESS NOTES
ESTABLISHED ANNUAL GYN VISIT     Patient Name:  Nichole Boss  :  1955  MR #:  [id number]  Acct #:  [id number]      ASSESSMENT/PLAN:   1. Well woman exam      2. Encounter for screening mammogram for malignant neoplasm of breast    - BI mammo bilateral screening tomosynthesis; Future    3. Hormone replacement therapy (postmenopausal)    - estradiol (Estrace) 0.01 % (0.1 mg/gram) vaginal cream; Insert applicatorful twice a week vaginally at bedtime twice a week.  Dispense: 42.5 g; Refill: 3    4. Genitourinary syndrome of menopause    - estradiol (Estrace) 0.01 % (0.1 mg/gram) vaginal cream; Insert applicatorful twice a week vaginally at bedtime twice a week.  Dispense: 42.5 g; Refill: 3    5. Subacute vulvitis    - clobetasol (Temovate) 0.05 % ointment; Apply small amount to perineum or vulva for flare/itching daily for 1 week, then three times a week for 1 week.  Dispense: 30 g; Refill: 2     .All questions answered.  Breast self exam technique reviewed and patient encouraged to perform self-exam monthly.  Discussed healthy lifestyle modifications.  Educational material distributed.  Follow up as needed.  Mammogram.    Counseling:  RBA for using HRT reviewed, including length of use.  Benefits outweigh risk for her.  Medication education:  Education:  All new and/or current medications discussed and reviewed including side effects with patient/caregiver, Understanding:  Caregiver/Patient expressed understanding., Adherence:  Barriers to adherence identified and discussed if present,     OB/GYN Preventive:     - Pap smear is not indicated in women over the age of 65 unless there is history of cervical or vaginal cancer, or other applicable high risk status.    - Self breast exam monthly and clinical breast examination yearly discussed - Next Mammogram due up to date   - Screening colonoscopy recommended starting age 45, then Q3-10 years depending on testing and family history - Next screen due  or   -  Osteoporosis prevention discussion included vit d3/calcium supplements, weight-bearing exercise - DEXA scan due 2025  - Genitourinary skin hygiene discussed.  - Diet/Weight management discussed.      Chief Complaint:  Annual exam    HPI:  Nichole Boss is a 68 y.o.  female who presents for pelvic and breast exam. She would like to continue estrogen therapy which helps her vaginal health, bones, mood, vasomotor sxs..    GYNH:   MENARCHE:  14   MENOPAUSE: 50,  Hysterectomy  Sexual activity Yes - . [place] Yes - .  HRT History Yes - .    Past Medical History:   Diagnosis Date    Awareness under anesthesia     Bilateral hearing loss     Does not wear her hearing aids on a daily basis       Past Surgical History:   Procedure Laterality Date    HYSTERECTOMY      age 50    MYOMECTOMY      STAPEDECTOMY      x3       Social History     Tobacco Use    Smoking status: Never    Smokeless tobacco: Never   Vaping Use    Vaping status: Never Used   Substance Use Topics    Alcohol use: Yes     Alcohol/week: 7.0 standard drinks of alcohol     Types: 7 Glasses of wine per week    Drug use: Never        Family History   Problem Relation Name Age of Onset    Heart disease Mother Kenyatta Alonso     Stroke Mother Kenyatta Alonso     Lymphoma Father 93     Blood Disorder Father 93     Leukemia Brother [facility]     Cancer Brother [facility]     Parkinsonism Brother         OB History          0    Para   0    Term   0       0    AB   0    Living   0         SAB   0    IAB   0    Ectopic   0    Multiple   0    Live Births   0                  Prior to Admission medications    Medication Sig Start Date End Date Taking? Authorizing Provider   calcium carbonate-vitamin D3 (Calcium 600 with Vitamin D3) 600 mg-12.5 mcg (500 unit) capsule Take 1 tablet by mouth 2 times a day. For 90 days 23  Yes Historical Provider, MD   cetirizine (ZyrTEC) 10 mg capsule Take by mouth.   Yes Historical Provider, MD   ciclopirox  "(Penlac) 8 % solution Apply topically once daily at bedtime.   Yes Historical Provider, MD   ketoconazole (NIZOral) 2 % cream Apply 1 Application topically once daily. 1/12/24  Yes Historical Provider, MD   multivit-min/iron/FA/vit K/lut (CENTRUM SILVER WOMEN ORAL) Take 1 capsule by mouth once daily.   Yes Historical Provider, MD   terbinafine (LamISIL) 250 mg tablet Take 1 tablet (250 mg) by mouth once daily.   Yes Historical Provider, MD   tretinoin (Retinoic Acid) powder As directed   Yes Historical Provider, MD   estradiol (Estrace) 0.01 % (0.1 mg/gram) vaginal cream Insert 0.25 Applicatorfuls (1 g) into the vagina once daily at bedtime. 1 to 3 times per week 2/24/16 4/24/24 Yes Historical Provider, MD   estradiol (Vagifem) 10 mcg tablet vaginal tablet Insert 1 tablet (10 mcg) into the vagina once daily at bedtime. 2x week  4/24/24 Yes Historical Provider, MD       Allergies   Allergen Reactions    Wasp Venom Unknown         ROS:   [facility] - WOMEN ONLY:          Breast Lump No acute changes.  Hot Flashes yes  Painful [place] sometimes - dryness.  Needs  refill for E2.   Vaginal Discharge no.     [facility] - ROS Update:          Unexplained Weight Change no.  Pain anywhere in your body no.  Black or bloody stools no.  Problems with urination no.  Rashes or sores no.  Sexual problems no.  Depression or anxiety problems no.  Do you feel threatened by anyone No.      OBJECTIVE:   /72   Ht 1.626 m (5' 4\")   Wt 57.4 kg (126 lb 9.6 oz)   BMI 21.73 kg/m²   Body mass index is 21.73 kg/m².     Physical Exam  GENERAL:   General Appearance:  well-developed, well-nourished, no functional handicap, well-groomed.  Hygiene:  good.  Ill-appearance:  none.  Mental Status:  alert and oriented. Speech:  clear.  Eye contact:  normal.  Appears stated age:  yes.     LUNGS:  Effort:  no respiratory distress.    HEART:  HRRR with S1/S2 w/o M/C/R  BREASTS: General:  no masses, no tenderness, no skin changes, no nipple abnormality, and " no axillary lymphadenopathy.   ABDOMEN: Tenderness:  none.  Distention:  none.   GENITOURINARY - FEMALE: Bladder:  normal.  Pelvic support defects: mild.  External genitalia:  Normal.  Urethra:  Normal.  Vagina:  no lesions, hypoestrogenic changes.  Absent uterus and cervix.  Adnexal areas:   normal , non tender.   DERMATOLOGY:  Skin: no acute lesions. Chronic changes.  EXTREMITIES: Normal:  no anomalies.  Edema:  none.    NEUROLOGICAL: Orientation:  alert and oriented x 3.   PSYCHOLOGY: Affect:  appropriate.  Mood:  pleasant.    Labs reviewed: Yes -     Imaging reviewed:  Yes -     Note: This dictation was generated using Dragon voice recognition software. Please excuse any grammatical or spelling errors that may have occurred using the system.

## 2024-04-25 DIAGNOSIS — Z79.890 HORMONE REPLACEMENT THERAPY (POSTMENOPAUSAL): ICD-10-CM

## 2024-04-25 DIAGNOSIS — N95.8 GENITOURINARY SYNDROME OF MENOPAUSE: ICD-10-CM

## 2024-04-25 RX ORDER — ESTRADIOL 10 UG/1
INSERT VAGINAL
OUTPATIENT
Start: 2024-04-25

## 2024-04-25 RX ORDER — CLOBETASOL PROPIONATE 0.5 MG/G
OINTMENT TOPICAL
Qty: 30 G | Refills: 2 | Status: SHIPPED | OUTPATIENT
Start: 2024-04-25

## 2024-04-25 RX ORDER — ESTRADIOL 0.1 MG/G
CREAM VAGINAL
Qty: 42.5 G | Refills: 3 | OUTPATIENT
Start: 2024-04-25

## 2024-05-08 ENCOUNTER — TELEPHONE (OUTPATIENT)
Dept: OTOLARYNGOLOGY | Facility: CLINIC | Age: 69
End: 2024-05-08
Payer: MEDICARE

## 2024-05-08 NOTE — TELEPHONE ENCOUNTER
Returned patient's call regarding scheduling for OSIA implantation in July/August. Provided dates in July for the procedure and left contact information for call back.

## 2024-05-09 ENCOUNTER — TELEPHONE (OUTPATIENT)
Dept: OTOLARYNGOLOGY | Facility: CLINIC | Age: 69
End: 2024-05-09
Payer: MEDICARE

## 2024-05-09 DIAGNOSIS — H91.92 HEARING LOSS OF LEFT EAR, UNSPECIFIED HEARING LOSS TYPE: ICD-10-CM

## 2024-05-09 DIAGNOSIS — H66.90 OTITIS MEDIA, UNSPECIFIED LATERALITY, UNSPECIFIED OTITIS MEDIA TYPE: ICD-10-CM

## 2024-05-09 DIAGNOSIS — Z01.818 PREOPERATIVE TESTING: ICD-10-CM

## 2024-05-09 RX ORDER — CIPROFLOXACIN HYDROCHLORIDE AND HYDROCORTISONE 2; 10 MG/ML; MG/ML
SUSPENSION AURICULAR (OTIC)
Qty: 10 ML | Refills: 0 | Status: SHIPPED | OUTPATIENT
Start: 2024-05-09

## 2024-05-09 NOTE — TELEPHONE ENCOUNTER
Spoke with patient and updated on new order for Ciprodex ear drops d/t recent patient concerns of possible infection in ear. Patient was scheduled to see Dr. Saavedra on 5/14. Patient aware of the above

## 2024-05-14 ENCOUNTER — OFFICE VISIT (OUTPATIENT)
Dept: OTOLARYNGOLOGY | Facility: CLINIC | Age: 69
End: 2024-05-14
Payer: MEDICARE

## 2024-05-14 VITALS — WEIGHT: 122.9 LBS | BODY MASS INDEX: 20.98 KG/M2 | TEMPERATURE: 97.6 F | HEIGHT: 64 IN

## 2024-05-14 DIAGNOSIS — H80.93 OTOSCLEROSIS OF BOTH EARS: Primary | ICD-10-CM

## 2024-05-14 PROCEDURE — 1159F MED LIST DOCD IN RCRD: CPT | Performed by: STUDENT IN AN ORGANIZED HEALTH CARE EDUCATION/TRAINING PROGRAM

## 2024-05-14 PROCEDURE — 99213 OFFICE O/P EST LOW 20 MIN: CPT | Performed by: STUDENT IN AN ORGANIZED HEALTH CARE EDUCATION/TRAINING PROGRAM

## 2024-05-14 PROCEDURE — 1160F RVW MEDS BY RX/DR IN RCRD: CPT | Performed by: STUDENT IN AN ORGANIZED HEALTH CARE EDUCATION/TRAINING PROGRAM

## 2024-05-14 PROCEDURE — 1036F TOBACCO NON-USER: CPT | Performed by: STUDENT IN AN ORGANIZED HEALTH CARE EDUCATION/TRAINING PROGRAM

## 2024-05-14 ASSESSMENT — PATIENT HEALTH QUESTIONNAIRE - PHQ9
1. LITTLE INTEREST OR PLEASURE IN DOING THINGS: NOT AT ALL
SUM OF ALL RESPONSES TO PHQ9 QUESTIONS 1 AND 2: 0
2. FEELING DOWN, DEPRESSED OR HOPELESS: NOT AT ALL

## 2024-05-15 NOTE — PROGRESS NOTES
This visit was performed virtually, and for this reason, physical exam was deferred.    CHIEF COMPLAINT:   Chief Complaint   Patient presents with    Follow-up     Hearing aides broke in ear        HISTORY OF PRESENT ILLNESS from 9/12/2023: Ms. Boss is a 68-year-old woman with a history of bilateral otosclerosis. Her left ear has always been her worst ear, and it continues to be her worst ear to this day. She has total of 4 surgeries on the left ear, most recently at the [facility] [place] with Dr. Mederos in 2009. She was told after that surgery that there was nothing else that could be done from a middle ear exploration standpoint on the left side. She has previously been told that there are no good options for the right ear given that this is her better hearing ear. She wears hearing aids in both of her ears. She reports that she does fairly well with the hearing aids, but is interested in any additional options.     Interval History: She has previously undergone BAHA evaluation and was interested in left osia implantation, which is planned for 7/3/2024.  She was having some left ear pain, which she thinks may have been related to a part of her hearing aid breaking in the ear.  She feels that this is mostly resolved with drops.      PAST MEDICAL HISTORY:   Past Medical History:   Diagnosis Date    Awareness under anesthesia     Bilateral hearing loss     Does not wear her hearing aids on a daily basis       PAST SURGICAL HISTORY:   Past Surgical History:   Procedure Laterality Date    HYSTERECTOMY      age 50    MYOMECTOMY      STAPEDECTOMY      x3       MEDICATIONS:   Current Outpatient Medications:     calcium carbonate-vitamin D3 (Calcium 600 with Vitamin D3) 600 mg-12.5 mcg (500 unit) capsule, Take 1 tablet by mouth 2 times a day. For 90 days, Disp: , Rfl:     cetirizine (ZyrTEC) 10 mg capsule, Take by mouth., Disp: , Rfl:     ciclopirox (Penlac) 8 % solution, Apply topically once daily at bedtime., Disp:  ", Rfl:     ciprofloxacin-hydrocortisone (Cipro HC) otic suspension, Instill 4 drops into affected ear twice daily x 14 days, Disp: 10 mL, Rfl: 0    clobetasol (Temovate) 0.05 % ointment, Apply small amount to perineum or vulva for flare/itching daily for 1 week, then three times a week for 1 week., Disp: 30 g, Rfl: 2    estradiol (Estrace) 0.01 % (0.1 mg/gram) vaginal cream, Insert applicatorful twice a week vaginally at bedtime twice a week., Disp: 42.5 g, Rfl: 3    ketoconazole (NIZOral) 2 % cream, Apply 1 Application topically once daily., Disp: , Rfl:     multivit-min/iron/FA/vit K/lut (CENTRUM SILVER WOMEN ORAL), Take 1 capsule by mouth once daily., Disp: , Rfl:     tretinoin (Retinoic Acid) powder, As directed, Disp: , Rfl:     terbinafine (LamISIL) 250 mg tablet, Take 1 tablet (250 mg) by mouth once daily., Disp: , Rfl:     Current Facility-Administered Medications:     fluconazole (Diflucan) tablet 150 mg, 150 mg, oral, Once, Ger De Los Santos MD    ALLERGIES:   Allergies   Allergen Reactions    Wasp Venom Unknown       SOCIAL HISTORY:   reports that she has never smoked. She has never used smokeless tobacco. She reports current alcohol use of about 7.0 standard drinks of alcohol per week. She reports that she does not use drugs.    FAMILY HISTORY: family history includes Blood Disorder in her father; Cancer in her brother; Heart disease in her mother; Leukemia in her brother; Lymphoma in her father; Parkinsonism in her brother; Stroke in her mother.    Exam:      11/3/2023    10:05 AM 11/3/2023    10:35 AM 11/3/2023    10:55 AM 11/6/2023     3:05 PM 11/8/2023     2:20 PM 4/24/2024     1:08 PM 5/14/2024     9:03 AM   Vitals   Systolic 128 130 97 104  118    Diastolic 72 70 50 68  72    Heart Rate 69 71 66 80      Temp  36.2 °C (97.2 °F) 36.2 °C (97.2 °F) 36.7 °C (98 °F)   36.4 °C (97.6 °F)   Resp 18 16 16       Height (in)    1.6 m (5' 3\") 1.626 m (5' 4\") 1.626 m (5' 4\") 1.626 m (5' 4\")   Weight (lb)    125 " 125 126.6 122.9   BMI    22.14 kg/m2 21.46 kg/m2 21.73 kg/m2 21.1 kg/m2   BSA (m2)    1.59 m2 1.6 m2 1.61 m2 1.59 m2   Visit Report    Report  Report Report     Exam:  NAD  Left ear, ear canal patent.  No foreign objects.  A small amount of cerumen was removed.  The tympanic membrane was intact and normal  Right ear, ear canal patent.  No foreign objects.   A small amount of cerumen was removed.  The tympanic membrane was intact and normal.    DATA REVIEWED:  Audiogram  I reviewed the audiogram from 9/11/2023, which demonstrates right moderate rising to normal conductive hearing loss and left moderate rising to mild and sloping to profound mixed hearing loss. There is type a tympanograms bilaterally. Word recognition score is 100% on the right and 90% on the left.     BAHA evaluation:  Baha 6 Max:  AIDED: CNC words at 50 dB HL= 92 %    AIDED: AzBIO sentences at 50 dB HL = 90 % with + 10 SNR  AIDED: AzBIO sentences at 50 dBHL = 94% in quiet     Patient's Left Hearing aid:  AIDED: CNC words at 50 dB HL= 58 %    AIDED: AzBIO sentences at 50 dB HL = 94 % with + 10 SNR     UNAIDED: CNC words at 50 dB HL= 48 %    UNAIDED: AzBIO sentences at 50 dB HL =  75 % with +10 SNR    Impression:  Right conductive hearing loss  Left mixed hearing loss  Bilateral otosclerosis  Left ear pain and possible retained foreign body -- resolved     Recommendation:  I provided reassurance that her ear canal is normal and she does not need any antibiotics. I discussed the risk, benefits of Osia implantation, including, not limited to, bleeding, pain, infection, implant malfunction, implant extrusion, need for further procedures. She would like to pursue left osia implantation.  We will plan for left osia in July.    Jaguar Saavedra MD

## 2024-05-30 ENCOUNTER — TELEPHONE (OUTPATIENT)
Dept: OBSTETRICS AND GYNECOLOGY | Facility: CLINIC | Age: 69
End: 2024-05-30
Payer: MEDICARE

## 2024-05-30 NOTE — TELEPHONE ENCOUNTER
Pt calling with some confusion on what she is supposed to be taking and something about approval and denial of meds. MA only can see the Estrace cream and clobetasol sent from last visit. Pt asking about a testosterone as well. Please advise. Pt is also leaving to go out of town.

## 2024-05-30 NOTE — TELEPHONE ENCOUNTER
Called and reviewed the plan with her again.  Reviewed her prescriptions.  She is going to apply clobetasol as a taper over 1 month: Twice a day for 1 week, once a day for 1 week, then 3 times a week for 2 weeks.  Going to use the Estrace cream now 2G twice weekly.  If her symptoms with intercourse do not improve she may call the office to request the compounded testosterone which is an out-of-pocket cost.

## 2024-06-06 NOTE — PROGRESS NOTES
This is a 69 year old female for ANNUAL WELLNESS visit and UTD on MAMM and C SCOPE          ROS is NEG for HEADACHE, NAUSEA, VOMITING, DIARRHEA, CHEST PAIN, SOB, and BLEEDING and as further REVIEWED BELOW.    Subjective   Nichole Boss is a 69 y.o. female who presents for Annual Exam.    HPI:    Per nursing intake, pt here for Annual Exam       Review of systems is essentially negative for all systems except for any identified issues in HPI above.    Objective     /62   Pulse 63   Temp 36.4 °C (97.5 °F)   Wt 57.2 kg (126 lb)   SpO2 98%   BMI 21.63 kg/m²      COMPLETE PHYSICAL EXAM    GENERAL           General Appearance: pleasant, well-appearing, well-developed, well-hydrated, well-nourished, well-groomed, .        HEENT           NECK supple, Neg for adneopathy no thyroid enlargement or nodules, Oropharynx normal no exudates.        EYES           Pupils: PERRLA, no photophobia.        HEART           Rate and Rhythm regular rate and rhythm. Heart sounds: normal S1S2. Murmurs: none.        LUNGS           Effort: Normal chest wall, no pectus, Normal air entry all fields, Clear to IPPA, RR<16 with no use of accessory muscles.        BREASTS           Bilaterally: no masses, no nipple retraction, no nipple discharge, no abnormal skin changes. Axilla: no lymphadenopathy.        BACK           General: unremarkable, no spinal tenderness or rashes.        ABDOMEN           General: Normal to inspection, neg for LKKS or masses and BSs heard in all quadrants       LYMPHATICS           Cervical: none. Axillary: none.        MUSCULOSKELETAL           gross abnormalities no gross abnormalities, no joint redness or swelling.        EXTREMITIES           Varicose veins: not present. Pulses: 2+ bilateral. Clubbing: none. Cyanosis: no.        NEUROLOGICAL           Orientation: alert and oriented x 3. Grossly normal: yes. Plantars: downgoing bilaterally. Muscle Bulk: normal . Cranial Nerves: CN's II-XII grossly  intact.        PSYCHOLOGY           Affect: appropriate. Mood: pleasant.     Assessment/Plan   Problem List Items Addressed This Visit    None  Visit Diagnoses       Routine medical exam    -  Primary    Relevant Orders    Comprehensive metabolic panel    Microscopic Only, Urine    Health counseling        Immunization counseling        Screening mammogram for breast cancer        Screening for colorectal cancer        Relevant Orders    CBC    Screening, lipid        Relevant Orders    Lipid panel            FOLLOW UP:   YEARLY for ROUTINE MEDICAL and PRN for other issues as discussed in visit         Shae San M.D.

## 2024-06-12 ENCOUNTER — OFFICE VISIT (OUTPATIENT)
Dept: PRIMARY CARE | Facility: CLINIC | Age: 69
End: 2024-06-12
Payer: MEDICARE

## 2024-06-12 ENCOUNTER — APPOINTMENT (OUTPATIENT)
Dept: CARDIOLOGY | Facility: HOSPITAL | Age: 69
End: 2024-06-12
Payer: MEDICARE

## 2024-06-12 ENCOUNTER — TELEPHONE (OUTPATIENT)
Dept: PRIMARY CARE | Facility: CLINIC | Age: 69
End: 2024-06-12
Payer: MEDICARE

## 2024-06-12 VITALS
BODY MASS INDEX: 21.63 KG/M2 | OXYGEN SATURATION: 98 % | SYSTOLIC BLOOD PRESSURE: 120 MMHG | HEART RATE: 63 BPM | DIASTOLIC BLOOD PRESSURE: 62 MMHG | WEIGHT: 126 LBS | TEMPERATURE: 97.5 F

## 2024-06-12 DIAGNOSIS — Z12.12 SCREENING FOR COLORECTAL CANCER: ICD-10-CM

## 2024-06-12 DIAGNOSIS — Z71.9 HEALTH COUNSELING: ICD-10-CM

## 2024-06-12 DIAGNOSIS — Z71.85 IMMUNIZATION COUNSELING: ICD-10-CM

## 2024-06-12 DIAGNOSIS — Z12.31 SCREENING MAMMOGRAM FOR BREAST CANCER: ICD-10-CM

## 2024-06-12 DIAGNOSIS — Z00.00 ROUTINE MEDICAL EXAM: Primary | ICD-10-CM

## 2024-06-12 DIAGNOSIS — Z13.220 SCREENING, LIPID: ICD-10-CM

## 2024-06-12 DIAGNOSIS — M81.0 AGE RELATED OSTEOPOROSIS, UNSPECIFIED PATHOLOGICAL FRACTURE PRESENCE: ICD-10-CM

## 2024-06-12 DIAGNOSIS — Z12.11 SCREENING FOR COLORECTAL CANCER: ICD-10-CM

## 2024-06-12 PROCEDURE — 1126F AMNT PAIN NOTED NONE PRSNT: CPT | Performed by: FAMILY MEDICINE

## 2024-06-12 PROCEDURE — 1159F MED LIST DOCD IN RCRD: CPT | Performed by: FAMILY MEDICINE

## 2024-06-12 PROCEDURE — 99215 OFFICE O/P EST HI 40 MIN: CPT | Performed by: FAMILY MEDICINE

## 2024-06-12 PROCEDURE — G0438 PPPS, INITIAL VISIT: HCPCS | Performed by: FAMILY MEDICINE

## 2024-06-12 PROCEDURE — 1036F TOBACCO NON-USER: CPT | Performed by: FAMILY MEDICINE

## 2024-06-12 ASSESSMENT — PAIN SCALES - GENERAL: PAINLEVEL: 0-NO PAIN

## 2024-06-12 NOTE — TELEPHONE ENCOUNTER
Pt lvm stating she has an appt scheduled today with San. Pt just returned from [place] and states she feel like she is coming down with a cold and is wondering if she should still come in for her appt or cancel. Please advise

## 2024-06-14 NOTE — CPM/PAT H&P
CPM/PAT Evaluation       Name: Nichole Boss (Nichole Boss)  /Age: 1955/69 y.o.     TELEMEDICINE ENCOUNTER  Patient was contacted by telephone for preadmission testing perioperative risk assessment prior to surgery.    CHIEF COMPLAINT  Otosclerosis    HPI  Nichole Boss is a 69-year-old female with long history of bilateral otosclerosis (left worse than right).  She has had 4 ear surgeries on her left ear most recently in .  She has hearing aids for both the ears, but is interested in other options.  She is interested in left Osia implantation and has been found to be a good candidate for this surgery.  She is scheduled for Osia implant left side on 2024.    ACTIVE PROBLEMS  Patient Active Problem List   Diagnosis    Dyspareunia, female    Female stress incontinence    Abnormal vaginal bleeding    Anxiety    Basal cell carcinoma (BCC)    Fusion of vulva    Genitourinary syndrome of menopause    Grief    Left carpal tunnel syndrome    Other hyperlipidemia    Otosclerosis of both ears    Paresthesia of left upper limb    PVD (peripheral vascular disease) (CMS-HCC)    Radiculitis of left cervical region    Sinusitis    Subacute vaginitis    Vaginal atrophy    Vitamin D deficiency    Vulvar burning    Hearing loss of left ear     PAST MEDICAL HISTORY  Past Medical History:   Diagnosis Date    Awareness under anesthesia     Bilateral hearing loss     Does not wear her hearing aids on a daily basis     SURGICAL HISTORY  Past Surgical History:   Procedure Laterality Date    CYSTOSCOPY      HYSTERECTOMY      age 50    MYOMECTOMY      STAPEDECTOMY      x3     ANESTHESIA HISTORY  Patient reports history of one-time intraoperative awareness.  Denies other problems with anesthesia in the past such as PONV, prolonged sedation, dental damage, aspiration, cardiac arrest, difficult intubation, or unexpected hospital admissions. Denies family history of malignant hyperthermia, or pseudocholinesterase  deficiency.    SOCIAL HISTORY  Never smoker; EtOH: About 7 drinks a week; denies recreational drug use.  Patient states she takes several exercise classes a week including yoga Pilates cardio, strength training.  She states she is able to do moderate ADLs such as heavy housework, light yard work.  She denies chest pain, CADENA.  METS 4    FAMILY HISTORY  Family History   Problem Relation Name Age of Onset    Heart disease Mother Kenyatta Alonso     Stroke Mother Kenyatta Alonso     Lymphoma Father 93     Blood Disorder Father 93     Leukemia Brother David Washington     Cancer Brother David Washington     Parkinsonism Brother       ALLERGIES  Allergies   Allergen Reactions    Wasp Venom Unknown     MEDICATIONS    Current Facility-Administered Medications:     fluconazole (Diflucan) tablet 150 mg, 150 mg, oral, Once, Ger De Los Santos MD    Current Outpatient Medications:     calcium carbonate-vitamin D3 (Calcium 600 with Vitamin D3) 600 mg-12.5 mcg (500 unit) capsule, Take 1 tablet by mouth 2 times a day. For 90 days, Disp: , Rfl:     cetirizine (ZyrTEC) 10 mg capsule, Take by mouth., Disp: , Rfl:     estradiol (Estrace) 0.01 % (0.1 mg/gram) vaginal cream, Insert applicatorful twice a week vaginally at bedtime twice a week., Disp: 42.5 g, Rfl: 3    multivit-min/iron/FA/vit K/lut (CENTRUM SILVER WOMEN ORAL), Take 1 capsule by mouth once daily., Disp: , Rfl:     ciclopirox (Penlac) 8 % solution, Apply topically once daily at bedtime., Disp: , Rfl:     ciprofloxacin-hydrocortisone (Cipro HC) otic suspension, Instill 4 drops into affected ear twice daily x 14 days (Patient not taking: Reported on 6/14/2024), Disp: 10 mL, Rfl: 0    clobetasol (Temovate) 0.05 % ointment, Apply small amount to perineum or vulva for flare/itching daily for 1 week, then three times a week for 1 week. (Patient not taking: Reported on 6/12/2024), Disp: 30 g, Rfl: 2    ketoconazole (NIZOral) 2 % cream, Apply 1 Application topically once daily., Disp:  , Rfl:     terbinafine (LamISIL) 250 mg tablet, Take 1 tablet (250 mg) by mouth once daily., Disp: , Rfl:     tretinoin (Retinoic Acid) powder, As directed, Disp: , Rfl:     Review of Systems   HENT:  Positive for hearing loss (Bilateral otosclerosis).         Bilateral hearing aids   All other systems reviewed and are negative.    PHYSICAL EXAM  Deferred    AIRWAY EXAM  Deferred    VITALS  No vitals taken for telemedicine visit  Height: 5 feet 4 inches; weight: 126 pounds; BMI: 21.63  BP Readings from Last 4 Encounters:   06/12/24 120/62   04/24/24 118/72   11/06/23 104/68   11/03/23 97/50       LABS  Lab Results   Component Value Date    WBC 11.4 (H) 08/28/2023    HGB 13.9 08/28/2023    HCT 41.2 08/28/2023    MCV 94.7 08/28/2023     08/28/2023     Lab Results   Component Value Date    GLUCOSE 113 (H) 08/28/2023    CALCIUM 9.1 08/28/2023     08/28/2023    K 3.8 08/28/2023    CO2 27 08/28/2023     08/28/2023    BUN 14 08/28/2023    CREATININE 0.7 08/28/2023     Lab orders placed by Dr. Saavedra on 05/09/2024 for CBC, CMP.  Patient expressed understanding that lab work was to be completed before 1 week of surgery.    IMAGING  EKG order placed by Dr. Saavedra on 05/09/2024      ASSESSMENT/PLAN  Otosclerosis, bilateral  Osia implant, left side      This note was created in part upon personal review of patient's medical records.  Speech recognition transcription software was used in the creation of this note. Despite proofreading, several typographical errors might be present that might affect the meaning of the content.

## 2024-06-14 NOTE — PREPROCEDURE INSTRUCTIONS
Pre-Op Instructions & Checklist  Your surgery has been scheduled at [facility] at [address] [place], [address], [address], in the [facility]. Parking is to the left of the main entrance.  You will be contacted about the time of yoursurgery the day before your surgery (if your surgery is on a Monday you will be called the Friday before  surgery). If you are unable to answer the phone, a detailed voicemail message will be left. Make sure that your voicemail box is not full so a message can be left. If you have not received a call by 3:00 pm you may call [phone] between the hours of 3:00 and 4:00 pm. Please be available by phone the night before/day of surgery in case there is a change in the schedule which may require you to arrive earlier/later.  14 DAYS BEFORE SURGERY STOP TAKING WEIGHT LOSS MEDICATIONS     7 DAYS BEFORE SURGERY STOP THESE MEDICATIONS:  Multiple Vitamins containing Vitamin E  Herbal supplements, Fish Oil, garlic pills, turmeric, CoQ enzyme  Stop taking aspirin, and aspirin-containing products as well as NSAID's such as Advil, Motrin, Aleve, Ibuprofen. Tylenol is okay to take for pain relief.   If you are currently taking Coumadin/Warfarin, we will have to coordinate that with your PCP &/or the Anticoagulation Clinic.  THE DAY BEFORE SURGERY:  *Do not eat any food after midnight the night before surgery.   *You are permitted to have clear liquids such as water, apple juice, plain tea or coffee (no milk or creamer), clear electrolyte-replenishing drinks such as Pedialyte, Gatorade, or Powerade (not yogurt or pulp-containing smoothies or juices such as orange juice) up to 2 hours before your surgery.    DAY OF SURGERY, TAKE THESE MEDICATIONS with a small sip of water (if it is not listed, do not take it):  There are no medications for you to take the morning of surgery       ON THE MORNING OF SURGERY:  *Shower either the night before your surgery or the  morning of your surgery  *Do not use moisturizers, creams, lotions or perfume, or make-up.  *Wear comfortable, loose fitting clothing.   *All jewelry and valuables should be left at home.  *Prosthetic devices such as contact lenses, hearing aids, dentures, eyelash extensions, hairpins and body piercing must be removed before surgery. Bring containers for eyeglasses/contacts, dentures, or hearing aids with you.  Diabetics: Please check fasting blood sugars upon waking up.  If fasting blood sugars are<80ml/dl, please drink 3 ounces of apple juice no later than 2 hours prior to surgery.    BRING WITH YOU:  *Photo ID and insurance card  *Current list of medicines and allergies  *Pacemaker/Defibrillator/Heart stent cards  *Copy of your complete Advanced Directive/DHPOA-if applicable    SMOKING:  *Quitting smoking can make a huge difference to your health and recovery from surgery.    *If you need help with quitting, call 9-079-QUIT-NOW.  Alcohol:  *No alcoholic beverages for 48 hours before surgery.    AFTER OUTPATIENT SURGERY:  *A responsible adult MUST accompany you at the time of discharge and stay with you for 24 hours after your surgery.  *You may NOT drive yourself home after surgery.  *You may use a taxi or ride sharing service ([employer], Uber) to return home ONLY if you are accompanied by a friend or family member.  *Instructions for resuming your medications will be provided by your surgeon.    CONTACT SURGEON'S OFFICE IF YOU DEVELOP:  * Fever =/> 100.4 F   * New respiratory symptoms (e.g. cough, shortness of breath, respiratory distress, sore throat)  * Recent loss of taste or smell  *Flu like symptoms such as headache, fatigue or gastrointestinal symptoms  * If you develop any open sores, shingles, burning or painful urination   AND/OR:  * You no longer wish to have the surgery.  * Any other personal circumstances change that may lead to the need to cancel or defer this surgery.  *You were admitted to any hospital  within one week of your planned procedure.      If you have any questions regarding these preoperative instructions you may call [phone]. If you have questions regarding you surgical procedure, or post-operative care/recovery please call your surgeon's office.

## 2024-06-17 ENCOUNTER — LAB (OUTPATIENT)
Dept: LAB | Facility: LAB | Age: 69
End: 2024-06-17
Payer: MEDICARE

## 2024-06-17 ENCOUNTER — HOSPITAL ENCOUNTER (OUTPATIENT)
Dept: CARDIOLOGY | Facility: HOSPITAL | Age: 69
Discharge: HOME | End: 2024-06-17
Payer: MEDICARE

## 2024-06-17 DIAGNOSIS — Z01.818 PREOPERATIVE TESTING: ICD-10-CM

## 2024-06-17 LAB
ABO GROUP (TYPE) IN BLOOD: NORMAL
ANION GAP SERPL CALC-SCNC: 13 MMOL/L
ANTIBODY SCREEN: NORMAL
BASOPHILS # BLD AUTO: 0.03 X10*3/UL (ref 0–0.1)
BASOPHILS NFR BLD AUTO: 0.5 %
BUN SERPL-MCNC: 18 MG/DL (ref 8–25)
CALCIUM SERPL-MCNC: 9.3 MG/DL (ref 8.5–10.4)
CHLORIDE SERPL-SCNC: 102 MMOL/L (ref 97–107)
CO2 SERPL-SCNC: 25 MMOL/L (ref 24–31)
CREAT SERPL-MCNC: 0.7 MG/DL (ref 0.4–1.6)
EGFRCR SERPLBLD CKD-EPI 2021: >90 ML/MIN/1.73M*2
EOSINOPHIL # BLD AUTO: 0.13 X10*3/UL (ref 0–0.7)
EOSINOPHIL NFR BLD AUTO: 2 %
ERYTHROCYTE [DISTWIDTH] IN BLOOD BY AUTOMATED COUNT: 13.1 % (ref 11.5–14.5)
GLUCOSE SERPL-MCNC: 92 MG/DL (ref 65–99)
HCT VFR BLD AUTO: 40.8 % (ref 36–46)
HGB BLD-MCNC: 13.5 G/DL (ref 12–16)
IMM GRANULOCYTES # BLD AUTO: 0.03 X10*3/UL (ref 0–0.7)
IMM GRANULOCYTES NFR BLD AUTO: 0.5 % (ref 0–0.9)
LYMPHOCYTES # BLD AUTO: 1.63 X10*3/UL (ref 1.2–4.8)
LYMPHOCYTES NFR BLD AUTO: 25.5 %
MCH RBC QN AUTO: 32.2 PG (ref 26–34)
MCHC RBC AUTO-ENTMCNC: 33.1 G/DL (ref 32–36)
MCV RBC AUTO: 97 FL (ref 80–100)
MONOCYTES # BLD AUTO: 0.61 X10*3/UL (ref 0.1–1)
MONOCYTES NFR BLD AUTO: 9.5 %
NEUTROPHILS # BLD AUTO: 3.96 X10*3/UL (ref 1.2–7.7)
NEUTROPHILS NFR BLD AUTO: 62 %
NRBC BLD-RTO: 0 /100 WBCS (ref 0–0)
PLATELET # BLD AUTO: 258 X10*3/UL (ref 150–450)
POTASSIUM SERPL-SCNC: 4.3 MMOL/L (ref 3.4–5.1)
RBC # BLD AUTO: 4.19 X10*6/UL (ref 4–5.2)
RH FACTOR (ANTIGEN D): NORMAL
SODIUM SERPL-SCNC: 140 MMOL/L (ref 133–145)
WBC # BLD AUTO: 6.4 X10*3/UL (ref 4.4–11.3)

## 2024-06-17 PROCEDURE — 85025 COMPLETE CBC W/AUTO DIFF WBC: CPT

## 2024-06-17 PROCEDURE — 36415 COLL VENOUS BLD VENIPUNCTURE: CPT

## 2024-06-17 PROCEDURE — 80048 BASIC METABOLIC PNL TOTAL CA: CPT

## 2024-06-17 PROCEDURE — 86901 BLOOD TYPING SEROLOGIC RH(D): CPT

## 2024-06-17 PROCEDURE — 86850 RBC ANTIBODY SCREEN: CPT

## 2024-06-17 PROCEDURE — 93005 ELECTROCARDIOGRAM TRACING: CPT

## 2024-06-17 PROCEDURE — 86900 BLOOD TYPING SEROLOGIC ABO: CPT

## 2024-06-20 ENCOUNTER — CLINICAL SUPPORT (OUTPATIENT)
Dept: AUDIOLOGY | Facility: CLINIC | Age: 69
End: 2024-06-20
Payer: MEDICARE

## 2024-06-20 DIAGNOSIS — H90.6 MIXED CONDUCTIVE AND SENSORINEURAL HEARING LOSS OF BOTH EARS: ICD-10-CM

## 2024-06-20 PROCEDURE — 92700 UNLISTED ORL SERVICE/PX: CPT | Performed by: SOCIAL WORKER

## 2024-06-20 NOTE — PROGRESS NOTES
History:  Nichole Bsos was seen 11/9/23 at the request of Dr. Saavedra for a bone anchored implant (BRENT) evaluation.  Nichole Boss arrives with a history of mixed hearing loss, otosclerosis and stapedectomy surgeries. Nichole Boss denies ear pain, ear pressure, ear drainage, ear infections, ear surgeries, tinnitus, noise exposure, family history of hearing loss and dizziness/vertigo.      Device selection:  The available accessory options were presented to Nichole today  Nichole selected the TV streamer wireless accessory for the OSIA2 processor  She is scheduled for surgery 7/3/24    Treatment Plan:  Pursue OSIA surgery  Return 4 weeks post surgery for processor activation

## 2024-06-21 ENCOUNTER — HOSPITAL ENCOUNTER (OUTPATIENT)
Dept: RADIOLOGY | Facility: HOSPITAL | Age: 69
Discharge: HOME | End: 2024-06-21
Payer: MEDICARE

## 2024-06-21 DIAGNOSIS — M81.0 AGE RELATED OSTEOPOROSIS, UNSPECIFIED PATHOLOGICAL FRACTURE PRESENCE: ICD-10-CM

## 2024-06-23 LAB
ATRIAL RATE: 61 BPM
P AXIS: 70 DEGREES
P OFFSET: 187 MS
P ONSET: 145 MS
PR INTERVAL: 156 MS
Q ONSET: 223 MS
QRS COUNT: 10 BEATS
QRS DURATION: 76 MS
QT INTERVAL: 436 MS
QTC CALCULATION(BAZETT): 438 MS
QTC FREDERICIA: 438 MS
R AXIS: 81 DEGREES
T AXIS: 79 DEGREES
T OFFSET: 441 MS
VENTRICULAR RATE: 61 BPM

## 2024-07-02 ENCOUNTER — ANESTHESIA EVENT (OUTPATIENT)
Dept: OPERATING ROOM | Facility: CLINIC | Age: 69
End: 2024-07-02
Payer: MEDICARE

## 2024-07-03 ENCOUNTER — ANESTHESIA (OUTPATIENT)
Dept: OPERATING ROOM | Facility: CLINIC | Age: 69
End: 2024-07-03
Payer: MEDICARE

## 2024-07-03 ENCOUNTER — HOSPITAL ENCOUNTER (OUTPATIENT)
Facility: CLINIC | Age: 69
Setting detail: OUTPATIENT SURGERY
Discharge: HOME | End: 2024-07-03
Attending: STUDENT IN AN ORGANIZED HEALTH CARE EDUCATION/TRAINING PROGRAM | Admitting: STUDENT IN AN ORGANIZED HEALTH CARE EDUCATION/TRAINING PROGRAM
Payer: MEDICARE

## 2024-07-03 VITALS
RESPIRATION RATE: 16 BRPM | DIASTOLIC BLOOD PRESSURE: 63 MMHG | HEIGHT: 64 IN | HEART RATE: 88 BPM | WEIGHT: 123.9 LBS | TEMPERATURE: 97.2 F | OXYGEN SATURATION: 100 % | BODY MASS INDEX: 21.15 KG/M2 | SYSTOLIC BLOOD PRESSURE: 134 MMHG

## 2024-07-03 DIAGNOSIS — H91.92 HEARING LOSS OF LEFT EAR, UNSPECIFIED HEARING LOSS TYPE: ICD-10-CM

## 2024-07-03 DIAGNOSIS — H90.A32 MIXED CONDUCTIVE AND SENSORINEURAL HEARING LOSS OF LEFT EAR WITH RESTRICTED HEARING OF RIGHT EAR: Primary | ICD-10-CM

## 2024-07-03 PROBLEM — T88.53XA AWARENESS UNDER ANESTHESIA: Status: ACTIVE | Noted: 2024-07-03

## 2024-07-03 PROCEDURE — 7100000010 HC PHASE TWO TIME - EACH INCREMENTAL 1 MINUTE: Performed by: STUDENT IN AN ORGANIZED HEALTH CARE EDUCATION/TRAINING PROGRAM

## 2024-07-03 PROCEDURE — 2500000005 HC RX 250 GENERAL PHARMACY W/O HCPCS: Performed by: NURSE ANESTHETIST, CERTIFIED REGISTERED

## 2024-07-03 PROCEDURE — 7100000001 HC RECOVERY ROOM TIME - INITIAL BASE CHARGE: Performed by: STUDENT IN AN ORGANIZED HEALTH CARE EDUCATION/TRAINING PROGRAM

## 2024-07-03 PROCEDURE — 7100000002 HC RECOVERY ROOM TIME - EACH INCREMENTAL 1 MINUTE: Performed by: STUDENT IN AN ORGANIZED HEALTH CARE EDUCATION/TRAINING PROGRAM

## 2024-07-03 PROCEDURE — 7100000009 HC PHASE TWO TIME - INITIAL BASE CHARGE: Performed by: STUDENT IN AN ORGANIZED HEALTH CARE EDUCATION/TRAINING PROGRAM

## 2024-07-03 PROCEDURE — 69716 IMPL OI IMPLT SK TC ESP<100: CPT | Performed by: STUDENT IN AN ORGANIZED HEALTH CARE EDUCATION/TRAINING PROGRAM

## 2024-07-03 PROCEDURE — 3600000008 HC OR TIME - EACH INCREMENTAL 1 MINUTE - PROCEDURE LEVEL THREE: Performed by: STUDENT IN AN ORGANIZED HEALTH CARE EDUCATION/TRAINING PROGRAM

## 2024-07-03 PROCEDURE — L8692 NON-OSSEOINTEGRATED SND PROC: HCPCS | Mod: MUE | Performed by: STUDENT IN AN ORGANIZED HEALTH CARE EDUCATION/TRAINING PROGRAM

## 2024-07-03 PROCEDURE — 2500000004 HC RX 250 GENERAL PHARMACY W/ HCPCS (ALT 636 FOR OP/ED): Performed by: ANESTHESIOLOGY

## 2024-07-03 PROCEDURE — 2780000003 HC OR 278 NO HCPCS: Performed by: STUDENT IN AN ORGANIZED HEALTH CARE EDUCATION/TRAINING PROGRAM

## 2024-07-03 PROCEDURE — 3700000001 HC GENERAL ANESTHESIA TIME - INITIAL BASE CHARGE: Performed by: STUDENT IN AN ORGANIZED HEALTH CARE EDUCATION/TRAINING PROGRAM

## 2024-07-03 PROCEDURE — 2500000004 HC RX 250 GENERAL PHARMACY W/ HCPCS (ALT 636 FOR OP/ED): Performed by: NURSE ANESTHETIST, CERTIFIED REGISTERED

## 2024-07-03 PROCEDURE — 2500000001 HC RX 250 WO HCPCS SELF ADMINISTERED DRUGS (ALT 637 FOR MEDICARE OP): Performed by: ANESTHESIOLOGY

## 2024-07-03 PROCEDURE — 3600000003 HC OR TIME - INITIAL BASE CHARGE - PROCEDURE LEVEL THREE: Performed by: STUDENT IN AN ORGANIZED HEALTH CARE EDUCATION/TRAINING PROGRAM

## 2024-07-03 PROCEDURE — L8690 AUD OSSEO DEV, INT/EXT COMP: HCPCS | Performed by: STUDENT IN AN ORGANIZED HEALTH CARE EDUCATION/TRAINING PROGRAM

## 2024-07-03 PROCEDURE — 2500000005 HC RX 250 GENERAL PHARMACY W/O HCPCS: Performed by: STUDENT IN AN ORGANIZED HEALTH CARE EDUCATION/TRAINING PROGRAM

## 2024-07-03 PROCEDURE — 2720000007 HC OR 272 NO HCPCS: Performed by: STUDENT IN AN ORGANIZED HEALTH CARE EDUCATION/TRAINING PROGRAM

## 2024-07-03 PROCEDURE — 3700000002 HC GENERAL ANESTHESIA TIME - EACH INCREMENTAL 1 MINUTE: Performed by: STUDENT IN AN ORGANIZED HEALTH CARE EDUCATION/TRAINING PROGRAM

## 2024-07-03 DEVICE — IMPLANTABLE DEVICE: Type: IMPLANTABLE DEVICE | Site: EAR | Status: FUNCTIONAL

## 2024-07-03 DEVICE — BI300 IMPLANT 4 MM
Type: IMPLANTABLE DEVICE | Site: EAR | Status: FUNCTIONAL
Brand: BAHA

## 2024-07-03 RX ORDER — HYDROMORPHONE HYDROCHLORIDE 1 MG/ML
0.4 INJECTION, SOLUTION INTRAMUSCULAR; INTRAVENOUS; SUBCUTANEOUS EVERY 5 MIN PRN
Status: DISCONTINUED | OUTPATIENT
Start: 2024-07-03 | End: 2024-07-03 | Stop reason: HOSPADM

## 2024-07-03 RX ORDER — PROPOFOL 10 MG/ML
INJECTION, EMULSION INTRAVENOUS AS NEEDED
Status: DISCONTINUED | OUTPATIENT
Start: 2024-07-03 | End: 2024-07-03

## 2024-07-03 RX ORDER — GLYCOPYRROLATE 0.2 MG/ML
INJECTION INTRAMUSCULAR; INTRAVENOUS AS NEEDED
Status: DISCONTINUED | OUTPATIENT
Start: 2024-07-03 | End: 2024-07-03

## 2024-07-03 RX ORDER — OXYCODONE HYDROCHLORIDE 5 MG/1
5 TABLET ORAL ONCE
Status: COMPLETED | OUTPATIENT
Start: 2024-07-03 | End: 2024-07-03

## 2024-07-03 RX ORDER — NORETHINDRONE AND ETHINYL ESTRADIOL 0.5-0.035
KIT ORAL AS NEEDED
Status: DISCONTINUED | OUTPATIENT
Start: 2024-07-03 | End: 2024-07-03

## 2024-07-03 RX ORDER — ONDANSETRON 4 MG/1
4 TABLET, FILM COATED ORAL EVERY 8 HOURS PRN
Qty: 20 TABLET | Refills: 0 | Status: SHIPPED | OUTPATIENT
Start: 2024-07-03

## 2024-07-03 RX ORDER — SODIUM CHLORIDE 0.9 G/100ML
IRRIGANT IRRIGATION AS NEEDED
Status: DISCONTINUED | OUTPATIENT
Start: 2024-07-03 | End: 2024-07-03 | Stop reason: HOSPADM

## 2024-07-03 RX ORDER — CEFAZOLIN 1 G/1
INJECTION, POWDER, FOR SOLUTION INTRAVENOUS AS NEEDED
Status: DISCONTINUED | OUTPATIENT
Start: 2024-07-03 | End: 2024-07-03

## 2024-07-03 RX ORDER — LIDOCAINE HYDROCHLORIDE 20 MG/ML
INJECTION, SOLUTION INFILTRATION; PERINEURAL AS NEEDED
Status: DISCONTINUED | OUTPATIENT
Start: 2024-07-03 | End: 2024-07-03

## 2024-07-03 RX ORDER — MIDAZOLAM HYDROCHLORIDE 1 MG/ML
INJECTION, SOLUTION INTRAMUSCULAR; INTRAVENOUS AS NEEDED
Status: DISCONTINUED | OUTPATIENT
Start: 2024-07-03 | End: 2024-07-03

## 2024-07-03 RX ORDER — LIDOCAINE IN NACL,ISO-OSMOT/PF 30 MG/3 ML
0.1 SYRINGE (ML) INJECTION ONCE
Status: DISCONTINUED | OUTPATIENT
Start: 2024-07-03 | End: 2024-07-03 | Stop reason: HOSPADM

## 2024-07-03 RX ORDER — ONDANSETRON HYDROCHLORIDE 2 MG/ML
INJECTION, SOLUTION INTRAVENOUS AS NEEDED
Status: DISCONTINUED | OUTPATIENT
Start: 2024-07-03 | End: 2024-07-03

## 2024-07-03 RX ORDER — SODIUM CHLORIDE, SODIUM LACTATE, POTASSIUM CHLORIDE, CALCIUM CHLORIDE 600; 310; 30; 20 MG/100ML; MG/100ML; MG/100ML; MG/100ML
100 INJECTION, SOLUTION INTRAVENOUS CONTINUOUS
Status: DISCONTINUED | OUTPATIENT
Start: 2024-07-03 | End: 2024-07-03 | Stop reason: HOSPADM

## 2024-07-03 RX ORDER — HYDROMORPHONE HYDROCHLORIDE 0.2 MG/ML
0.2 INJECTION INTRAMUSCULAR; INTRAVENOUS; SUBCUTANEOUS EVERY 5 MIN PRN
Status: DISCONTINUED | OUTPATIENT
Start: 2024-07-03 | End: 2024-07-03 | Stop reason: HOSPADM

## 2024-07-03 RX ORDER — ONDANSETRON HYDROCHLORIDE 2 MG/ML
4 INJECTION, SOLUTION INTRAVENOUS ONCE AS NEEDED
Status: DISCONTINUED | OUTPATIENT
Start: 2024-07-03 | End: 2024-07-03 | Stop reason: HOSPADM

## 2024-07-03 RX ORDER — TRAMADOL HYDROCHLORIDE 50 MG/1
50 TABLET ORAL EVERY 8 HOURS PRN
Qty: 20 TABLET | Refills: 0 | Status: SHIPPED | OUTPATIENT
Start: 2024-07-03

## 2024-07-03 RX ORDER — ROCURONIUM BROMIDE 10 MG/ML
INJECTION, SOLUTION INTRAVENOUS AS NEEDED
Status: DISCONTINUED | OUTPATIENT
Start: 2024-07-03 | End: 2024-07-03

## 2024-07-03 RX ORDER — FENTANYL CITRATE 50 UG/ML
INJECTION, SOLUTION INTRAMUSCULAR; INTRAVENOUS AS NEEDED
Status: DISCONTINUED | OUTPATIENT
Start: 2024-07-03 | End: 2024-07-03

## 2024-07-03 RX ORDER — LIDOCAINE HYDROCHLORIDE AND EPINEPHRINE 10; 10 MG/ML; UG/ML
INJECTION, SOLUTION INFILTRATION; PERINEURAL AS NEEDED
Status: DISCONTINUED | OUTPATIENT
Start: 2024-07-03 | End: 2024-07-03 | Stop reason: HOSPADM

## 2024-07-03 RX ORDER — DEXAMETHASONE SODIUM PHOSPHATE 100 MG/10ML
INJECTION INTRAMUSCULAR; INTRAVENOUS AS NEEDED
Status: DISCONTINUED | OUTPATIENT
Start: 2024-07-03 | End: 2024-07-03

## 2024-07-03 RX ORDER — DOCUSATE SODIUM 100 MG/1
100 CAPSULE, LIQUID FILLED ORAL 2 TIMES DAILY
Qty: 30 CAPSULE | Refills: 0 | Status: SHIPPED | OUTPATIENT
Start: 2024-07-03

## 2024-07-03 RX ORDER — ACETAMINOPHEN 325 MG/1
650 TABLET ORAL EVERY 4 HOURS PRN
Status: DISCONTINUED | OUTPATIENT
Start: 2024-07-03 | End: 2024-07-03 | Stop reason: HOSPADM

## 2024-07-03 RX ORDER — SODIUM CHLORIDE, SODIUM LACTATE, POTASSIUM CHLORIDE, CALCIUM CHLORIDE 600; 310; 30; 20 MG/100ML; MG/100ML; MG/100ML; MG/100ML
INJECTION, SOLUTION INTRAVENOUS CONTINUOUS PRN
Status: DISCONTINUED | OUTPATIENT
Start: 2024-07-03 | End: 2024-07-03

## 2024-07-03 RX ORDER — POLYMYXIN B 500000 [USP'U]/1
INJECTION, POWDER, LYOPHILIZED, FOR SOLUTION INTRAMUSCULAR; INTRATHECAL; INTRAVENOUS; OPHTHALMIC AS NEEDED
Status: DISCONTINUED | OUTPATIENT
Start: 2024-07-03 | End: 2024-07-03 | Stop reason: HOSPADM

## 2024-07-03 RX ORDER — METOCLOPRAMIDE HYDROCHLORIDE 5 MG/ML
10 INJECTION INTRAMUSCULAR; INTRAVENOUS ONCE AS NEEDED
Status: DISCONTINUED | OUTPATIENT
Start: 2024-07-03 | End: 2024-07-03 | Stop reason: HOSPADM

## 2024-07-03 RX ADMIN — OXYCODONE HYDROCHLORIDE 5 MG: 5 TABLET ORAL at 10:13

## 2024-07-03 RX ADMIN — SODIUM CHLORIDE, POTASSIUM CHLORIDE, SODIUM LACTATE AND CALCIUM CHLORIDE 100 ML/HR: 600; 310; 30; 20 INJECTION, SOLUTION INTRAVENOUS at 07:10

## 2024-07-03 SDOH — HEALTH STABILITY: MENTAL HEALTH: CURRENT SMOKER: 0

## 2024-07-03 ASSESSMENT — PAIN - FUNCTIONAL ASSESSMENT
PAIN_FUNCTIONAL_ASSESSMENT: 0-10

## 2024-07-03 ASSESSMENT — PAIN SCALES - GENERAL
PAINLEVEL_OUTOF10: 5 - MODERATE PAIN
PAINLEVEL_OUTOF10: 5 - MODERATE PAIN
PAINLEVEL_OUTOF10: 0 - NO PAIN
PAINLEVEL_OUTOF10: 4
PAINLEVEL_OUTOF10: 5 - MODERATE PAIN
PAINLEVEL_OUTOF10: 5 - MODERATE PAIN

## 2024-07-03 ASSESSMENT — COLUMBIA-SUICIDE SEVERITY RATING SCALE - C-SSRS
2. HAVE YOU ACTUALLY HAD ANY THOUGHTS OF KILLING YOURSELF?: NO
1. IN THE PAST MONTH, HAVE YOU WISHED YOU WERE DEAD OR WISHED YOU COULD GO TO SLEEP AND NOT WAKE UP?: NO
6. HAVE YOU EVER DONE ANYTHING, STARTED TO DO ANYTHING, OR PREPARED TO DO ANYTHING TO END YOUR LIFE?: NO

## 2024-07-03 NOTE — OP NOTE
OSIA Osseointegrated Steady State Implant (L) Operative Note     Date: 7/3/2024  OR Location: [facility] [place] [facility] [place]    Name: Nichole Boss : 1955, Age: 69 y.o., MRN: [id number], Sex: female    Diagnosis  Pre-op Diagnosis     * Hearing loss of left ear, unspecified hearing loss type [H91.92] Post-op Diagnosis     * Hearing loss of left ear, unspecified hearing loss type [H91.92]     Procedures  OSIA Osseointegrated Steady State Implant  32566 - OK IMPL OI IMPLT SKULL MAG TC ATTACHMENT DORIS<100    OK AUD OSSEO DEV, INT/EXT COMP []  Surgeons      * Jaguar Saavedra - Primary    Resident/Fellow/Other Assistant:  Surgeons and Role:  * No surgeons found with a matching role *    Procedure Summary  Anesthesia: General  ASA: III  Anesthesia Staff: Anesthesiologist: Alex Tang DO  CRNA: Carlotta Fishman APRN-CRNA  SRNA: Demond Torres RN  Estimated Blood Loss: 10 mL  Intra-op Medications:   Administrations occurring from 0730 to 1130 on 24:   Medication Name Total Dose   polymyxin B injection 50,000 Units   lidocaine-epinephrine (Xylocaine W/EPI) 1 %-1:100,000 injection 6 mL   sodium chloride 0.9 % irrigation solution 1,000 mL   lactated Ringer's infusion 145 mL              Anesthesia Record               Intraprocedure I/O Totals          Intake    Remifentanil Drip 0.00 mL    The total shown is the total volume documented since Anesthesia Start was filed.    Total Intake 0 mL          Specimen: No specimens collected     Staff:   Circulator: Christy Woodwardub Person: Renetta         Drains and/or Catheters: * None in log *    Tourniquet Times:         Implants:  Implants       Type Name Action Serial No.      Cochlear Implant IMPLANT,  4MM BAHA3 - [id number] Implanted      Other COCHLEAR OSIA IMPLANT Implanted [id number]              Findings: Osia implantation performed and implant placed with torque of 50 N-cm with curvilinear incision across the neck of the implant.      Indications: Nichole ANNE  Karyn is an 69 y.o. female who has a history of otosclerosis, who has undergone multiple left sided middle ear explorations.  She has persistent left sided mixed hearing loss.  She is interested in Osia implantation for improvement of her hearing.  I discussed the risks of bleeding, pain, infection, scarring, implant infection, implant malfunction, CSF leak, implant extrusion, need for further procedures.  She elected to proceed and signed a written informed consent.    Procedure Details: The patient was identified in the preoperative holding area.  Patient was then transported to the operating room.  A preoperative huddle was then performed.  General anesthesia was then induced.  Patient was turned to a degrees.  A implant template was used to kevin out the approximate location of the implant with the bony mass transducer approximate the level of the ear canal over a flat portion of the temporal bone on the left side..  At the magnet site, a needle was used to measure the thickness of the skin flap, which was approximately 5 mm in all 3 locations.  A planned curvilinear incision was then planned across the neck of the planned site for the implant.  1% lidocaine with epinephrine was used to anesthetize the area.  Small amount of hair was shaved.  The patient was then prepped and draped in usual, standard, sterile fashion.  A curvilinear incision was then made.  A small skin flap was elevated in order to make periosteal closure easier.  A curvilinear periosteal incision was then made.  Hemostasis was achieved using Bovie and bipolar electrocautery.  A superior and inferior pocket was then elevated.  The template was then inserted into the pocket and positioned over a flat portion of the bone, though there was some angulation to the skull and was unable to identify completely flat surface.  Once a suitable position was identified, a 3 mm guide hole was drilled.  This was palpated and felt to be bony at the depth.   This guide hole was then extended to 4 mm.  A 4 mm widening bur was then used to widen the bur.  A 4 mm implant was then inserted into the bone with a torque of 50 muteness centimeters.  Of note, I did need to hand screw the last portion to ensure that the implant was flush against the bone.  At this point, the ossea implant was opened and connected to the internal implant with the screw in his hand tightened.  This appeared to fit well in the pocket and was flush against the bone on 3 sides.  There was a slight gap between the anterior superior edge of the floating mass transducer and the bone, but was otherwise flush against all other areas of the bone, and its movement did not appear to be impeded by bone in any aspect.  This also did not seem to significantly increase the skin tension in this area.  The periosteal flap was easily closed over the implant with 3-0 Vicryl.  The subcutaneous tissue was then closed with 4-0 Monocryl in a deep dermal fashion.  Skin was closed with Dermabond.  A Emil dressing was then applied.  The patient was then turned over to anesthesia, where she awoke without complication.  Complications:  None; patient tolerated the procedure well.    Disposition: PACU - hemodynamically stable.  Condition: stable       Jaguar Saavedra  Phone Number: [phone]

## 2024-07-03 NOTE — ANESTHESIA POSTPROCEDURE EVALUATION
Patient: Nichole Boss    Procedure Summary       Date: 07/03/24 Room / Location[facility] [place] [facility] Virtual [facility] [place] [facility] OR    Anesthesia Start: 0730 Anesthesia Stop: 0950    Procedure: OSIA Osseointegrated Steady State Implant (Left: Ear) Diagnosis:       Hearing loss of left ear, unspecified hearing loss type      (Hearing loss of left ear, unspecified hearing loss type [H91.92])    Surgeons: Jaguar Saavedra MD Responsible Provider: Alex Tang DO    Anesthesia Type: general ASA Status: 3            Anesthesia Type: general    Vitals Value Taken Time   /62 07/03/24 1028   Temp 36.2 °C (97.2 °F) 07/03/24 1028   Pulse 89 07/03/24 1028   Resp 16 07/03/24 1028   SpO2 100 % 07/03/24 1028       Anesthesia Post Evaluation    Patient location during evaluation: PACU  Patient participation: complete - patient participated  Level of consciousness: awake and alert  Pain management: adequate  Airway patency: patent  Cardiovascular status: acceptable and blood pressure returned to baseline  Respiratory status: acceptable  Hydration status: acceptable  Postoperative Nausea and Vomiting: none        There were no known notable events for this encounter.

## 2024-07-03 NOTE — ANESTHESIA PROCEDURE NOTES
Airway  Date/Time: 7/3/2024 7:45 AM  Urgency: elective    Airway not difficult    Staffing  Performed: SAE   Authorized by: Alex Tang DO    Performed by: LAKSHMI William  Patient location during procedure: OR    Indications and Patient Condition  Indications for airway management: anesthesia and airway protection  Spontaneous Ventilation: absent  Sedation level: deep  Preoxygenated: yes  Patient position: sniffing  MILS maintained throughout  Mask difficulty assessment: 1 - vent by mask    Final Airway Details  Final airway type: endotracheal airway      Successful airway: ETT  Cuffed: yes   Successful intubation technique: direct laryngoscopy  Facilitating devices/methods: intubating stylet  Endotracheal tube insertion site: oral  Blade: Junior  ETT size (mm): 7.0  Cormack-Lehane Classification: grade I - full view of glottis  Placement verified by: chest auscultation and capnometry   Measured from: lips  ETT to lips (cm): 22  Number of attempts at approach: 1    Additional Comments  Atraumatic

## 2024-07-03 NOTE — DISCHARGE INSTRUCTIONS
Most ear surgeries should have a 2-4 week postoperative appointment. Please be sure to call the doctor's office and make a follow-up appointment, if you don't already have it.  Dressing or Band-Aid can be removed the day after surgery.  Once removed, replace the cotton ball in the ear as needed.  Once the dressing is off, and if you have an incision behind your ear with stitches, clean the incision twice daily with soap and water and apply Vaseline or antibiotic ointment after cleaning. If you have paper strips or surgical glue over the incision, Do not apply anything behind the ear.  You may shower the day after surgery, if you keep your head dry.  The hair may be shampooed 2 days following surgery.  You may get water on incision or in ear canal, but do not actively scrub the incision.  Bloody discharge from incision area may occur during the first 10 days following surgery.  If this persists or increases, please call the office.  A full sensation with popping sounds may be noticed during the healing process.  Do not use Q-Tips or put anything in the canal, until approved by your doctor.  Minor swelling of the face on the same side of the surgery is not uncommon.  Small bruising near the eye or mouth is not uncommon from the facial nerve monitor.  You might notice pain when chewing, please use soft diet for 2 weeks if you experience this.  No lifting (more than 10 lbs) or straining until follow up.  You will be discharged on pain medications  You may resume your routine medications as directed, unless you have been instructed otherwise by the prescribing healthcare provider.  Should you experience any difficulty upon returning home, or if you simply have questions, please contact us.  As your surgeons, we are most familiar with your operation and postoperative procedures.  We are accessible by telephone 24 hours a day, 7 days a week.  Once we have assessed your situation, we will be prepared to make  specific suggestions for your care.

## 2024-07-03 NOTE — H&P
CHIEF COMPLAINT:        Chief Complaint   Patient presents with    Follow-up       Hearing aides broke in ear          HISTORY OF PRESENT ILLNESS from 9/12/2023: Ms. Boss is a 68-year-old woman with a history of bilateral otosclerosis. Her left ear has always been her worst ear, and it continues to be her worst ear to this day. She has total of 4 surgeries on the left ear, most recently at the [facility] with Dr. Mederos in 2009. She was told after that surgery that there was nothing else that could be done from a middle ear exploration standpoint on the left side. She has previously been told that there are no good options for the right ear given that this is her better hearing ear. She wears hearing aids in both of her ears. She reports that she does fairly well with the hearing aids, but is interested in any additional options.      Interval History from 5/14/2024: She has previously undergone BAHA evaluation and was interested in left osia implantation, which is planned for 7/3/2024.  She was having some left ear pain, which she thinks may have been related to a part of her hearing aid breaking in the ear.  She feels that this is mostly resolved with drops.       Interval History: She returns for left osia implantation today    PAST MEDICAL HISTORY:   Medical History        Past Medical History:   Diagnosis Date    Awareness under anesthesia      Bilateral hearing loss       Does not wear her hearing aids on a daily basis           PAST SURGICAL HISTORY:   Surgical History         Past Surgical History:   Procedure Laterality Date    HYSTERECTOMY         age 50    MYOMECTOMY        STAPEDECTOMY         x3            MEDICATIONS:   Current Outpatient Medications:     calcium carbonate-vitamin D3 (Calcium 600 with Vitamin D3) 600 mg-12.5 mcg (500 unit) capsule, Take 1 tablet by mouth 2 times a day. For 90 days, Disp: , Rfl:     cetirizine (ZyrTEC) 10 mg capsule, Take by mouth., Disp: , Rfl:      "ciclopirox (Penlac) 8 % solution, Apply topically once daily at bedtime., Disp: , Rfl:     ciprofloxacin-hydrocortisone (Cipro HC) otic suspension, Instill 4 drops into affected ear twice daily x 14 days, Disp: 10 mL, Rfl: 0    clobetasol (Temovate) 0.05 % ointment, Apply small amount to perineum or vulva for flare/itching daily for 1 week, then three times a week for 1 week., Disp: 30 g, Rfl: 2    estradiol (Estrace) 0.01 % (0.1 mg/gram) vaginal cream, Insert applicatorful twice a week vaginally at bedtime twice a week., Disp: 42.5 g, Rfl: 3    ketoconazole (NIZOral) 2 % cream, Apply 1 Application topically once daily., Disp: , Rfl:     multivit-min/iron/FA/vit K/lut (CENTRUM SILVER WOMEN ORAL), Take 1 capsule by mouth once daily., Disp: , Rfl:     tretinoin (Retinoic Acid) powder, As directed, Disp: , Rfl:     terbinafine (LamISIL) 250 mg tablet, Take 1 tablet (250 mg) by mouth once daily., Disp: , Rfl:      Current Facility-Administered Medications:     fluconazole (Diflucan) tablet 150 mg, 150 mg, oral, Once, Ger De Los Santos MD     ALLERGIES:        Allergies   Allergen Reactions    Wasp Venom Unknown         SOCIAL HISTORY:   reports that she has never smoked. She has never used smokeless tobacco. She reports current alcohol use of about 7.0 standard drinks of alcohol per week. She reports that she does not use drugs.     FAMILY HISTORY: family history includes Blood Disorder in her father; Cancer in her brother; Heart disease in her mother; Leukemia in her brother; Lymphoma in her father; Parkinsonism in her brother; Stroke in her mother.     Exam:  /58   Pulse 60   Temp 36.6 °C (97.9 °F) (Temporal)   Resp 16   Ht 1.626 m (5' 4\")   Wt 56.2 kg (123 lb 14.4 oz)   SpO2 99%   BMI 21.27 kg/m²     Exam:  CONSTITUTIONAL: No acute distress  VOICE: No hoarseness or other abnormality  RESPIRATION: Breathing comfortably, no stridor  CV: No clubbing/cyanosis/edema in hands  EYES: EOM intact, sclera " clear  NEURO: Alert and oriented times 3, Cranial nerves II-XII grossly intact and symmetric bilaterally  HEAD AND FACE: Symmetric facial features, no masses or lesions  SALIVARY GLANDS: Parotid and submandibular glands normal bilaterally  RIGHT EAR: Normal external ear and post auricular area,   LEFT EAR: Normal external ear and post auricular area,   NOSE: External nose midline  ORAL CAVITY/OROPHARYNX/LIPS: Normal mucous membranes, normal floor of mouth/tongue/OP, no masses or lesions  PHARYNGEAL WALLS: No masses or lesions  NECK/LYMPH: No LAD, no thyroid masses, trachea midline  SKIN: Neck and facial skin is without scar or injury  PSYCH: Alert and oriented with appropriate mood and affect     DATA REVIEWED:  Audiogram  I reviewed the audiogram from 9/11/2023, which demonstrates right moderate rising to normal conductive hearing loss and left moderate rising to mild and sloping to profound mixed hearing loss. There is type a tympanograms bilaterally. Word recognition score is 100% on the right and 90% on the left.     BAHA evaluation:  Baha 6 Max:  AIDED: CNC words at 50 dB HL= 92 %    AIDED: AzBIO sentences at 50 dB HL = 90 % with + 10 SNR  AIDED: AzBIO sentences at 50 dBHL = 94% in quiet     Patient's Left Hearing aid:  AIDED: CNC words at 50 dB HL= 58 %    AIDED: AzBIO sentences at 50 dB HL = 94 % with + 10 SNR     UNAIDED: CNC words at 50 dB HL= 48 %    UNAIDED: AzBIO sentences at 50 dB HL =  75 % with +10 SNR     Impression:  Right conductive hearing loss  Left mixed hearing loss  Bilateral otosclerosis  Left ear pain and possible retained foreign body -- resolved      Recommendation:  I provided reassurance that her ear canal is normal and she does not need any antibiotics. I discussed the risk, benefits of Osia implantation, including, not limited to, bleeding, pain, infection, implant malfunction, implant extrusion, cerebrospinal fluid leak, need for further procedures. She would like to pursue left osia  implantation.  We will proceed with surgery today.     Jaguar Saavedra MD

## 2024-07-03 NOTE — ANESTHESIA PREPROCEDURE EVALUATION
Patient: Nichole Boss    Procedure Information       Date/Time: 07/03/24 0730    Procedure: OSIA Osseointegrated Steady State Implant (Left)    [place]: [facility] [place] [facility] [place] / [facility] [place] [facility]    Surgeons: Jaguar Saavedra MD            Relevant Problems   Anesthesia   (+) Awareness under anesthesia      Cardiac   (+) Other hyperlipidemia   (+) PVD (peripheral vascular disease) (CMS-HCC)      Pulmonary (within normal limits)      Neuro   (+) Anxiety   (+) Left carpal tunnel syndrome   (+) Radiculitis of left cervical region      GI (within normal limits)      /Renal (within normal limits)      Liver (within normal limits)      Endocrine (within normal limits)      Hematology (within normal limits)      Musculoskeletal   (+) Left carpal tunnel syndrome      HEENT   (+) Hearing loss of left ear   (+) Sinusitis      Skin   (+) Basal cell carcinoma (BCC)     There were no vitals filed for this visit.    Past Surgical History:   Procedure Laterality Date    CYSTOSCOPY      HYSTERECTOMY      age 50    MYOMECTOMY      STAPEDECTOMY      x3     Past Medical History:   Diagnosis Date    Awareness under anesthesia     Bilateral hearing loss     Does not wear her hearing aids on a daily basis     No current facility-administered medications for this encounter.  Prior to Admission medications    Medication Sig Start Date End Date Taking? Authorizing Provider   calcium carbonate-vitamin D3 (Calcium 600 with Vitamin D3) 600 mg-12.5 mcg (500 unit) capsule Take 1 tablet by mouth 2 times a day. For 90 days 8/2/23  Yes Historical Provider, MD   cetirizine (ZyrTEC) 10 mg capsule Take by mouth.   Yes Historical Provider, MD   clobetasol (Temovate) 0.05 % ointment Apply small amount to perineum or vulva for flare/itching daily for 1 week, then three times a week for 1 week. 4/25/24  Yes Sena Puga,    estradiol (Estrace) 0.01 % (0.1 mg/gram) vaginal cream Insert applicatorful twice a week vaginally at bedtime twice a week.  "4/24/24  Yes Sena Puga,    ketoconazole (NIZOral) 2 % cream Apply 1 Application topically once daily. 1/12/24  Yes Historical Provider, MD   multivit-min/iron/FA/vit K/lut (CENTRUM SILVER WOMEN ORAL) Take 1 capsule by mouth once daily.   Yes Historical Provider, MD   tretinoin (Retinoic Acid) powder As directed   Yes Historical Provider, MD   ciclopirox (Penlac) 8 % solution Apply topically once daily at bedtime.    Historical Provider, MD   ciprofloxacin-hydrocortisone (Cipro HC) otic suspension Instill 4 drops into affected ear twice daily x 14 days  Patient not taking: Reported on 6/14/2024 5/9/24   Jaguar Saavedra MD   terbinafine (LamISIL) 250 mg tablet Take 1 tablet (250 mg) by mouth once daily.    Historical Provider, MD     Allergies   Allergen Reactions    Wasp Venom Unknown     Social History     Tobacco Use    Smoking status: Never    Smokeless tobacco: Never   Substance Use Topics    Alcohol use: Yes     Alcohol/week: 7.0 standard drinks of alcohol     Types: 7 Glasses of wine per week         Chemistry    Lab Results   Component Value Date/Time     06/17/2024 1438    K 4.3 06/17/2024 1438     06/17/2024 1438    CO2 25 06/17/2024 1438    BUN 18 06/17/2024 1438    CREATININE 0.70 06/17/2024 1438    Lab Results   Component Value Date/Time    CALCIUM 9.3 06/17/2024 1438    ALKPHOS 80 03/20/2024 1556    AST 22 03/20/2024 1556    ALT 26 03/20/2024 1556    BILITOT <0.2 03/20/2024 1556          Lab Results   Component Value Date/Time    WBC 6.4 06/17/2024 1438    HGB 13.5 06/17/2024 1438    HCT 40.8 06/17/2024 1438     06/17/2024 1438     No results found for: \"PROTIME\", \"PTT\", \"INR\"  Encounter Date: 06/17/24   ECG 12 Lead   Result Value    Ventricular Rate 61    Atrial Rate 61    AL Interval 156    QRS Duration 76    QT Interval 436    QTC Calculation(Bazett) 438    P Axis 70    R Axis 81    T Axis 79    QRS Count 10    Q Onset 223    P Onset 145    P Offset 187    T Offset 441    " QTC Fredericia 438    Narrative    Normal sinus rhythm  Normal ECG  No previous ECGs available  Confirmed by Ramon More [id number]) on 6/23/2024 9:04:45 PM         Clinical information reviewed:   Tobacco  Allergies  Meds   Med Hx  Surg Hx  OB Status  Fam Hx  Soc   Hx        NPO Detail:  NPO/Void Status  Date of Last Liquid: 07/02/24  Time of Last Liquid: 2200  Date of Last Solid: 07/02/24  Time of Last Solid: 2200  Last Intake Type: Clear fluids  Time of Last Void: 0600         Physical Exam    Airway  Mallampati: III  TM distance: >3 FB  Neck ROM: full     Cardiovascular   Rhythm: regular     Dental   Comments: Denies   Pulmonary   Breath sounds clear to auscultation     Abdominal            Anesthesia Plan    History of general anesthesia?: yes  History of complications of general anesthesia?: yes    ASA 3     general     The patient is not a current smoker.    intravenous induction   Postoperative administration of opioids is intended.  Trial extubation is planned.  Anesthetic plan and risks discussed with patient.  Use of blood products discussed with patient who consented to blood products.    Plan discussed with CRNA.

## 2024-07-05 ASSESSMENT — PAIN SCALES - GENERAL: PAINLEVEL_OUTOF10: 0 - NO PAIN

## 2024-07-08 ENCOUNTER — TELEPHONE (OUTPATIENT)
Dept: OTOLARYNGOLOGY | Facility: CLINIC | Age: 69
End: 2024-07-08
Payer: MEDICARE

## 2024-07-08 NOTE — TELEPHONE ENCOUNTER
Left confidential voice message to follow up with patient post surgery. Reminded patient of post op visit and encouraged to contact the office or myself with any questions/concerns. Contact information provided.

## 2024-07-18 ENCOUNTER — OFFICE VISIT (OUTPATIENT)
Dept: OTOLARYNGOLOGY | Facility: CLINIC | Age: 69
End: 2024-07-18
Payer: MEDICARE

## 2024-07-18 VITALS
WEIGHT: 124.13 LBS | HEIGHT: 64 IN | BODY MASS INDEX: 21.19 KG/M2 | RESPIRATION RATE: 16 BRPM | SYSTOLIC BLOOD PRESSURE: 133 MMHG | TEMPERATURE: 97.3 F | HEART RATE: 73 BPM | DIASTOLIC BLOOD PRESSURE: 74 MMHG | OXYGEN SATURATION: 98 %

## 2024-07-18 DIAGNOSIS — H80.93 OTOSCLEROSIS OF BOTH EARS: Primary | ICD-10-CM

## 2024-07-18 PROCEDURE — 1126F AMNT PAIN NOTED NONE PRSNT: CPT | Performed by: STUDENT IN AN ORGANIZED HEALTH CARE EDUCATION/TRAINING PROGRAM

## 2024-07-18 PROCEDURE — 1160F RVW MEDS BY RX/DR IN RCRD: CPT | Performed by: STUDENT IN AN ORGANIZED HEALTH CARE EDUCATION/TRAINING PROGRAM

## 2024-07-18 PROCEDURE — 3008F BODY MASS INDEX DOCD: CPT | Performed by: STUDENT IN AN ORGANIZED HEALTH CARE EDUCATION/TRAINING PROGRAM

## 2024-07-18 PROCEDURE — 1036F TOBACCO NON-USER: CPT | Performed by: STUDENT IN AN ORGANIZED HEALTH CARE EDUCATION/TRAINING PROGRAM

## 2024-07-18 PROCEDURE — 99211 OFF/OP EST MAY X REQ PHY/QHP: CPT | Performed by: STUDENT IN AN ORGANIZED HEALTH CARE EDUCATION/TRAINING PROGRAM

## 2024-07-18 PROCEDURE — 1159F MED LIST DOCD IN RCRD: CPT | Performed by: STUDENT IN AN ORGANIZED HEALTH CARE EDUCATION/TRAINING PROGRAM

## 2024-07-18 SDOH — ECONOMIC STABILITY: FOOD INSECURITY: WITHIN THE PAST 12 MONTHS, THE FOOD YOU BOUGHT JUST DIDN'T LAST AND YOU DIDN'T HAVE MONEY TO GET MORE.: NEVER TRUE

## 2024-07-18 SDOH — ECONOMIC STABILITY: FOOD INSECURITY: WITHIN THE PAST 12 MONTHS, YOU WORRIED THAT YOUR FOOD WOULD RUN OUT BEFORE YOU GOT MONEY TO BUY MORE.: NEVER TRUE

## 2024-07-18 ASSESSMENT — PATIENT HEALTH QUESTIONNAIRE - PHQ9
SUM OF ALL RESPONSES TO PHQ9 QUESTIONS 1 AND 2: 0
1. LITTLE INTEREST OR PLEASURE IN DOING THINGS: NOT AT ALL
2. FEELING DOWN, DEPRESSED OR HOPELESS: NOT AT ALL

## 2024-07-18 ASSESSMENT — LIFESTYLE VARIABLES
HOW OFTEN DO YOU HAVE SIX OR MORE DRINKS ON ONE OCCASION: NEVER
SKIP TO QUESTIONS 9-10: 1
HOW MANY STANDARD DRINKS CONTAINING ALCOHOL DO YOU HAVE ON A TYPICAL DAY: 1 OR 2
HOW OFTEN DO YOU HAVE A DRINK CONTAINING ALCOHOL: 4 OR MORE TIMES A WEEK
AUDIT-C TOTAL SCORE: 4

## 2024-07-18 ASSESSMENT — PAIN SCALES - GENERAL: PAINLEVEL: 0-NO PAIN

## 2024-07-18 ASSESSMENT — ENCOUNTER SYMPTOMS
OCCASIONAL FEELINGS OF UNSTEADINESS: 0
DEPRESSION: 0
LOSS OF SENSATION IN FEET: 0

## 2024-07-19 NOTE — PROGRESS NOTES
"  Date of surgery: 7/3/2024     CC: Post-op visit    SUBJECTIVE:  Patient underwent a  Left osia implant procedure.  No complaints and specifically denies vertigo, dizziness, otorrhea, or otalgia.    ROS:  She denies constitutional symptoms of fatigue, weakness, weight loss or gain, fevers, night sweats.     OBJECTIVE:    The patient is well-developed, well-nourished in no acute distress with a good ability to communicate without hearing aids.  She has normal facial strength and symmetry and is alert and oriented to time, person, and place with appropriate mood and affect.     Vitals:    07/18/24 1141   BP: 133/74   Pulse: 73   Resp: 16   Temp: 36.3 °C (97.3 °F)   TempSrc: Temporal   SpO2: 98%   Weight: 56.3 kg (124 lb 2 oz)   Height: 1.626 m (5' 4\")        Incision healing well.  Tympanic membrane intact    Operative findings: Osia implantation performed and implant placed with torque of 50 N-cm with curvilinear incision across the neck of the implant.      ASSESSMENT and PLAN:  Left mixed hearing loss s/p osia implantation. Doing well.  Operative findings were discussed with the patient.  -- Cleared for activation  -- Follow-up in 6 months with an otologist    Jaguar Saavedra MD  "

## 2024-07-25 ENCOUNTER — CLINICAL SUPPORT (OUTPATIENT)
Dept: AUDIOLOGY | Facility: CLINIC | Age: 69
End: 2024-07-25

## 2024-07-25 DIAGNOSIS — H90.6 MIXED CONDUCTIVE AND SENSORINEURAL HEARING LOSS OF BOTH EARS: ICD-10-CM

## 2024-07-25 PROCEDURE — V5241 DISPENSING FEE, MONAURAL: HCPCS | Mod: AUDSP | Performed by: SOCIAL WORKER

## 2024-07-25 NOTE — PROGRESS NOTES
Name: Nichole Boss    YOB: 1955    Age: 69 y.o.       Date of Evaluation:  07/25/2024      Nichole Boss, 69 y.o., was seen on 7/25/24 for the activation of her left OSIA2 processor. Nichole was implanted on by Dr. Saavedra on 7/3/24. Nichole reports the post operative course is unremarkable except some pain at the ear. Nichole Boss has a history of mixed hearing loss.       IMPLANT  : [employer]chle[employer]  Implant: [id number]  Surgeon: Dr. Saavedra  Surgery Date: 7/3/24    SOUND PROCESSOR  Model: OSIA2  SN: [id number]    Otoscopic inspection indicated clear ear canals and visualization of the tympanic membrane bilaterally.    Processor was programming via Audiogram Direct via the fitting software. Feedback manager was completed. Additional programming included reducing gain, decreasing loudness due to patient report and adding a noise program in slot 2.    The wireless TV streamer was paired and use was demonstrated  Nichole was encouraged to set up a Cochlear account and install the Nucleus Osia Smart Josep       Treatment Plan:  1.      Follow-up with Dr. Saavedra as directed  2.      Return in 4 weeks for fine tuning and aided testing  3.      Annual hearing tests

## 2024-07-26 ENCOUNTER — TELEPHONE (OUTPATIENT)
Dept: OTOLARYNGOLOGY | Facility: CLINIC | Age: 69
End: 2024-07-26
Payer: MEDICARE

## 2024-07-26 NOTE — TELEPHONE ENCOUNTER
Spoke with patient at length regarding persistent low level pain at left inner ear. Patient is s/p OSIA on 7/3. She denies drainage or any other symptoms. Patient's ears assessed per audiologist on 7/25 and findings were unremarkable and without signs of infection. Patient agrees that she will continue analgesics for next week and call if pain persists or worsens. Recommended contacting ENT on-call after hours for non-emergent concerns and present to ER with emergent ear concerns.

## 2024-08-02 ENCOUNTER — LAB (OUTPATIENT)
Dept: LAB | Facility: LAB | Age: 69
End: 2024-08-02
Payer: MEDICARE

## 2024-08-02 DIAGNOSIS — Z00.00 ROUTINE MEDICAL EXAM: ICD-10-CM

## 2024-08-02 DIAGNOSIS — Z12.11 SCREENING FOR COLORECTAL CANCER: ICD-10-CM

## 2024-08-02 DIAGNOSIS — Z12.12 SCREENING FOR COLORECTAL CANCER: ICD-10-CM

## 2024-08-02 DIAGNOSIS — Z13.220 SCREENING, LIPID: ICD-10-CM

## 2024-08-02 LAB
ALBUMIN SERPL-MCNC: 4.4 G/DL (ref 3.5–5)
ALP BLD-CCNC: 100 U/L (ref 35–125)
ALT SERPL-CCNC: 34 U/L (ref 5–40)
ANION GAP SERPL CALC-SCNC: 14 MMOL/L
APPEARANCE UR: ABNORMAL
AST SERPL-CCNC: 24 U/L (ref 5–40)
BILIRUB SERPL-MCNC: 0.3 MG/DL (ref 0.1–1.2)
BILIRUB UR STRIP.AUTO-MCNC: NEGATIVE MG/DL
BUN SERPL-MCNC: 12 MG/DL (ref 8–25)
CALCIUM SERPL-MCNC: 9 MG/DL (ref 8.5–10.4)
CHLORIDE SERPL-SCNC: 104 MMOL/L (ref 97–107)
CHOLEST SERPL-MCNC: 236 MG/DL (ref 133–200)
CHOLEST/HDLC SERPL: 3.5 {RATIO}
CO2 SERPL-SCNC: 25 MMOL/L (ref 24–31)
COLOR UR: ABNORMAL
CREAT SERPL-MCNC: 0.6 MG/DL (ref 0.4–1.6)
EGFRCR SERPLBLD CKD-EPI 2021: >90 ML/MIN/1.73M*2
ERYTHROCYTE [DISTWIDTH] IN BLOOD BY AUTOMATED COUNT: 13 % (ref 11.5–14.5)
GLUCOSE SERPL-MCNC: 99 MG/DL (ref 65–99)
GLUCOSE UR STRIP.AUTO-MCNC: NORMAL MG/DL
HCT VFR BLD AUTO: 41.7 % (ref 36–46)
HCV AB SER QL: NONREACTIVE
HDLC SERPL-MCNC: 68 MG/DL
HGB BLD-MCNC: 13.5 G/DL (ref 12–16)
HOLD SPECIMEN: NORMAL
KETONES UR STRIP.AUTO-MCNC: NEGATIVE MG/DL
LDLC SERPL CALC-MCNC: 131 MG/DL (ref 65–130)
LEUKOCYTE ESTERASE UR QL STRIP.AUTO: ABNORMAL
MCH RBC QN AUTO: 32.1 PG (ref 26–34)
MCHC RBC AUTO-ENTMCNC: 32.4 G/DL (ref 32–36)
MCV RBC AUTO: 99 FL (ref 80–100)
MUCOUS THREADS #/AREA URNS AUTO: NORMAL /LPF
NITRITE UR QL STRIP.AUTO: NEGATIVE
NRBC BLD-RTO: 0 /100 WBCS (ref 0–0)
PH UR STRIP.AUTO: 7 [PH]
PLATELET # BLD AUTO: 259 X10*3/UL (ref 150–450)
POTASSIUM SERPL-SCNC: 4.5 MMOL/L (ref 3.4–5.1)
PROT SERPL-MCNC: 6.4 G/DL (ref 5.9–7.9)
PROT UR STRIP.AUTO-MCNC: NEGATIVE MG/DL
RBC # BLD AUTO: 4.21 X10*6/UL (ref 4–5.2)
RBC # UR STRIP.AUTO: NEGATIVE /UL
RBC #/AREA URNS AUTO: NORMAL /HPF
SODIUM SERPL-SCNC: 143 MMOL/L (ref 133–145)
SP GR UR STRIP.AUTO: 1.01
SQUAMOUS #/AREA URNS AUTO: NORMAL /HPF
TRIGL SERPL-MCNC: 186 MG/DL (ref 40–150)
UROBILINOGEN UR STRIP.AUTO-MCNC: NORMAL MG/DL
WBC # BLD AUTO: 4.1 X10*3/UL (ref 4.4–11.3)
WBC #/AREA URNS AUTO: NORMAL /HPF

## 2024-08-02 PROCEDURE — 87086 URINE CULTURE/COLONY COUNT: CPT

## 2024-08-02 PROCEDURE — 86803 HEPATITIS C AB TEST: CPT

## 2024-08-02 PROCEDURE — 36415 COLL VENOUS BLD VENIPUNCTURE: CPT

## 2024-08-03 LAB — BACTERIA UR CULT: NORMAL

## 2024-08-15 ENCOUNTER — APPOINTMENT (OUTPATIENT)
Dept: AUDIOLOGY | Facility: CLINIC | Age: 69
End: 2024-08-15
Payer: MEDICARE

## 2024-09-13 ENCOUNTER — CLINICAL SUPPORT (OUTPATIENT)
Dept: AUDIOLOGY | Facility: CLINIC | Age: 69
End: 2024-09-13
Payer: MEDICARE

## 2024-09-13 DIAGNOSIS — H90.6 MIXED CONDUCTIVE AND SENSORINEURAL HEARING LOSS OF BOTH EARS: Primary | ICD-10-CM

## 2024-09-13 PROCEDURE — V5241 DISPENSING FEE, MONAURAL: HCPCS

## 2024-09-13 NOTE — PROGRESS NOTES
OSIA CHECK    Name: Nichole Boss  YOB: 1955  Age: 69 y.o.    Date:  09/13/2024    History:  Nichole Boss , age 69 years, is here for an OSIA check. Nichole was implanted on by Dr. Saavedra on 7/3/24. Nichole reports the post operative course is unremarkable except some pain at the ear. Nichole Boss has a history of mixed hearing loss. She was previously seen by Dr. Lira, but has transferred her care due to location (patient lives in [place]). She is here today to program her replacement OSIA processor.      IMPLANT  : [employer]  Implant: [id number]  Surgeon: Dr. Saavedra  Surgery Date: 7/3/24    SOUND PROCESSOR  Model: OSIA2  SN: [id number] (new device)    OSIA Check Procedure  Hair clip was connected to the OSIA processor, per patient request. BC Direct completed. Overall gain decreased to patient preference. Patient was informed that she does not need to wear her right hearing aid, as the OSIA sends sound to both ears by bone conduction. If patient perceives subjective benefit from wearing her hearing aid with the OSIA, she can however continue to wear it.     Patient was able to secure OSIA to her implant and use the hair clip.   OSIA was connected to her phone and the Nucleus salomón. She was shown how to make changes in the salomón.    Patient denied pain with the processor, however she noted feeling pressure from it. She was encouraged to monitor her magnet site and contact this clinician if any pain, redness, or discharge occurs.     This clinician will register the new processor online.     Recommendations  1) Continue use of OSIA system  2) Return in 1 month for OSIA check   3) Re-test hearing annually     Time: 3416-9676    BIJAN Segal, CCC-A  Licensed Audiologist

## 2024-09-13 NOTE — LETTER
September 13, 2024     Shae Serrano MD  [address] [facility] [address]    Patient: Nichole Boss   YOB: 1955   Date of Visit: 9/13/2024       Dear Dr. Shae Serrano MD:    Thank you for referring Nichole Boss to me for evaluation. Below are my notes for this consultation.  If you have questions, please do not hesitate to call me. I look forward to following your patient along with you.       Sincerely,     Radha Arreola, AUD, CCC-A      CC: No Recipients  ______________________________________________________________________________________    OSIA CHECK    Name: Nichole Boss  YOB: 1955  Age: 69 y.o.    Date:  09/13/2024    History:  Nichole Boss , age 69 years, is here for an OSIA check. Nichole was implanted on by Dr. Saavedra on 7/3/24. Nichole reports the post operative course is unremarkable except some pain at the ear. Nichole Boss has a history of mixed hearing loss. She was previously seen by Dr. Lira, but has transferred her care due to location (patient lives in [place]). She is here today to program her replacement OSIA processor.      IMPLANT  : [employer]  Implant: [id number]  Surgeon: Dr. Saavdera  Surgery Date: 7/3/24    SOUND PROCESSOR  Model: OSIA2  SN: [id number] (new device)    OSIA Check Procedure  Hair clip was connected to the OSIA processor, per patient request. BC Direct completed. Overall gain decreased to patient preference. Patient was informed that she does not need to wear her right hearing aid, as the OSIA sends sound to both ears by bone conduction. If patient perceives subjective benefit from wearing her hearing aid with the OSIA, she can however continue to wear it.     Patient was able to secure OSIA to her implant and use the hair clip.   OSIA was connected to her phone and the Nucleus salomón. She was shown how to make changes in the salomón.    Patient denied pain with the processor, however she noted feeling  pressure from it. She was encouraged to monitor her magnet site and contact this clinician if any pain, redness, or discharge occurs.     This clinician will register the new processor online.     Recommendations  1) Continue use of OSIA system  2) Return in 1 month for OSIA check   3) Re-test hearing annually     Time: 3567-7026    BIJAN Segal, CCC-A  Licensed Audiologist

## 2024-10-14 ENCOUNTER — APPOINTMENT (OUTPATIENT)
Dept: AUDIOLOGY | Facility: CLINIC | Age: 69
End: 2024-10-14
Payer: MEDICARE

## 2024-10-15 ENCOUNTER — CLINICAL SUPPORT (OUTPATIENT)
Dept: AUDIOLOGY | Facility: CLINIC | Age: 69
End: 2024-10-15
Payer: MEDICARE

## 2024-10-15 DIAGNOSIS — H90.6 MIXED CONDUCTIVE AND SENSORINEURAL HEARING LOSS OF BOTH EARS: Primary | ICD-10-CM

## 2024-10-15 NOTE — PROGRESS NOTES
OSIA CHECK    Name: Nichole Boss  YOB: 1955  Age: 69 y.o.    Date:  10/15/2024    History:  Nichole Boss , age 69 years, is here for an OSIA check. Nichole was implanted on by Dr. Saavedra on 7/3/24. Nichole reports the post operative course is unremarkable except some pain at the ear. Nichole Boss has a history of mixed hearing loss. Today, she noted that the implant/processor gets hot after 8-12 hours of use. She reported satisfaction with the sound quality and loudness of the device.      IMPLANT  : [employer]  Implant: [id number]  Surgeon: Dr. Saavedra  Surgery Date: 7/3/24    SOUND PROCESSOR  Model: OSIA2  SN: [id number] (new device)    OSIA Check Procedure  Visual inspection of magnet site did not reveal any redness, irritation, or signs of infection.    Her TV streamer was paired to her new processor. Patient was shown how to use this accessory through the salomón.     Patient denied wanting changes made today.     Patient would like to transfer her ENT care to Dr. Calvin. This clinician will contact his nurse to get this appointment set up.    Recommendations  1) Continue use of OSIA system  2) Return in June for an updated hearing test and OSIA check  3) Schedule OSIA checks as needed    Time: 1399-3419    BIJAN Segal, CCC-A  Licensed Audiologist

## 2024-10-28 ENCOUNTER — APPOINTMENT (OUTPATIENT)
Dept: PRIMARY CARE | Facility: CLINIC | Age: 69
End: 2024-10-28
Payer: MEDICARE

## 2024-10-28 VITALS
SYSTOLIC BLOOD PRESSURE: 120 MMHG | HEART RATE: 63 BPM | HEIGHT: 64 IN | WEIGHT: 121 LBS | OXYGEN SATURATION: 100 % | BODY MASS INDEX: 20.66 KG/M2 | DIASTOLIC BLOOD PRESSURE: 62 MMHG

## 2024-10-28 DIAGNOSIS — L71.9 ROSACEA: ICD-10-CM

## 2024-10-28 DIAGNOSIS — H80.93 OTOSCLEROSIS OF BOTH EARS: ICD-10-CM

## 2024-10-28 DIAGNOSIS — B35.1 ONYCHOMYCOSIS: ICD-10-CM

## 2024-10-28 DIAGNOSIS — R32 URINARY INCONTINENCE, UNSPECIFIED TYPE: Primary | ICD-10-CM

## 2024-10-28 DIAGNOSIS — N95.8 GENITOURINARY SYNDROME OF MENOPAUSE: ICD-10-CM

## 2024-10-28 DIAGNOSIS — L65.9 ALOPECIA: ICD-10-CM

## 2024-10-28 DIAGNOSIS — E78.00 ELEVATED LDL CHOLESTEROL LEVEL: ICD-10-CM

## 2024-10-28 DIAGNOSIS — Z76.89 ENCOUNTER TO ESTABLISH CARE: ICD-10-CM

## 2024-10-28 DIAGNOSIS — R22.31 NODULE OF RIGHT PALM: ICD-10-CM

## 2024-10-28 PROBLEM — Q52.5: Status: RESOLVED | Noted: 2023-11-04 | Resolved: 2024-10-28

## 2024-10-28 PROBLEM — R20.2 PARESTHESIA OF LEFT UPPER LIMB: Status: RESOLVED | Noted: 2023-11-04 | Resolved: 2024-10-28

## 2024-10-28 PROBLEM — J32.9 SINUSITIS: Status: RESOLVED | Noted: 2023-11-04 | Resolved: 2024-10-28

## 2024-10-28 PROBLEM — N93.9 ABNORMAL VAGINAL BLEEDING: Status: RESOLVED | Noted: 2023-11-04 | Resolved: 2024-10-28

## 2024-10-28 PROBLEM — N76.1 SUBACUTE VAGINITIS: Status: RESOLVED | Noted: 2023-11-04 | Resolved: 2024-10-28

## 2024-10-28 PROBLEM — M54.12 RADICULITIS OF LEFT CERVICAL REGION: Status: RESOLVED | Noted: 2023-11-04 | Resolved: 2024-10-28

## 2024-10-28 PROBLEM — N94.89 VULVAR BURNING: Status: RESOLVED | Noted: 2023-11-04 | Resolved: 2024-10-28

## 2024-10-28 PROBLEM — T88.53XA AWARENESS UNDER ANESTHESIA: Status: RESOLVED | Noted: 2024-07-03 | Resolved: 2024-10-28

## 2024-10-28 PROBLEM — I73.9 PVD (PERIPHERAL VASCULAR DISEASE) (CMS-HCC): Status: RESOLVED | Noted: 2023-11-04 | Resolved: 2024-10-28

## 2024-10-28 PROBLEM — F43.21 GRIEF: Status: RESOLVED | Noted: 2023-11-04 | Resolved: 2024-10-28

## 2024-10-28 PROBLEM — G56.02 LEFT CARPAL TUNNEL SYNDROME: Status: RESOLVED | Noted: 2023-11-04 | Resolved: 2024-10-28

## 2024-10-28 PROBLEM — F41.9 ANXIETY: Status: RESOLVED | Noted: 2023-11-04 | Resolved: 2024-10-28

## 2024-10-28 PROCEDURE — 1160F RVW MEDS BY RX/DR IN RCRD: CPT | Performed by: NURSE PRACTITIONER

## 2024-10-28 PROCEDURE — 1159F MED LIST DOCD IN RCRD: CPT | Performed by: NURSE PRACTITIONER

## 2024-10-28 PROCEDURE — 3008F BODY MASS INDEX DOCD: CPT | Performed by: NURSE PRACTITIONER

## 2024-10-28 PROCEDURE — 1036F TOBACCO NON-USER: CPT | Performed by: NURSE PRACTITIONER

## 2024-10-28 PROCEDURE — 99203 OFFICE O/P NEW LOW 30 MIN: CPT | Performed by: NURSE PRACTITIONER

## 2024-10-28 RX ORDER — ALUMINUM CHLORIDE 20 %
SOLUTION, NON-ORAL TOPICAL NIGHTLY
COMMUNITY

## 2024-10-28 RX ORDER — TRETINOIN 1 MG/G
CREAM TOPICAL NIGHTLY
COMMUNITY

## 2024-11-05 DIAGNOSIS — N39.3 INCONTINENCE OVERFLOW, STRESS FEMALE: Primary | ICD-10-CM

## 2024-11-05 DIAGNOSIS — N39.490 INCONTINENCE OVERFLOW, STRESS FEMALE: Primary | ICD-10-CM

## 2024-11-13 ENCOUNTER — OFFICE VISIT (OUTPATIENT)
Dept: ORTHOPEDIC SURGERY | Facility: CLINIC | Age: 69
End: 2024-11-13
Payer: MEDICARE

## 2024-11-13 DIAGNOSIS — R22.31 NODULE OF RIGHT PALM: ICD-10-CM

## 2024-11-13 DIAGNOSIS — M72.0 DUPUYTREN'S DISEASE OF PALM OF RIGHT HAND: ICD-10-CM

## 2024-11-13 PROCEDURE — 99203 OFFICE O/P NEW LOW 30 MIN: CPT | Performed by: ORTHOPAEDIC SURGERY

## 2024-11-13 PROCEDURE — 99213 OFFICE O/P EST LOW 20 MIN: CPT | Performed by: ORTHOPAEDIC SURGERY

## 2024-11-13 PROCEDURE — 1036F TOBACCO NON-USER: CPT | Performed by: ORTHOPAEDIC SURGERY

## 2024-11-13 PROCEDURE — 1160F RVW MEDS BY RX/DR IN RCRD: CPT | Performed by: ORTHOPAEDIC SURGERY

## 2024-11-13 PROCEDURE — 1159F MED LIST DOCD IN RCRD: CPT | Performed by: ORTHOPAEDIC SURGERY

## 2024-11-13 NOTE — LETTER
"November 29, 2024     KAITY Caraballo-CNP  [address] [facility]  [address]  [facility] [address]    Patient: Nichole Boss   YOB: 1955   Date of Visit: 11/13/2024       Dear Dr. Mary Springer, KAITY-CNP:    Thank you for referring Nichole Boss to me for evaluation. Below are my notes for this consultation.  If you have questions, please do not hesitate to call me. I look forward to following your patient along with you.       Sincerely,     Javy Herbert MD      CC: No Recipients  ______________________________________________________________________________________    CHIEF COMPLAINT         Right palm nodule    ASSESSMENT + PLAN    Dupuytren's nodule in palm of right ring ray    I reviewed the nature of Dupuytren's disease and the intermittently progressive typical clinical course.  I discussed the multitude of treatment options including simple observation, formal open surgical cord excision, needle release, and Xiaflex enzyme injection along with the major risks, benefits, and durability of each option.    As there is currently no contracture or limitation on motion, there is no need for any formal intervention at this point.  I reviewed the \"tabletop test\" and other common functional limitations that should prompt a return to clinic, should they become apparent.  Continue with unrestricted activity.    Follow-up with any additional concerns.        HISTORY OF PRESENT ILLNESS       Patient is a 69 y.o. right-hand dominant female retiree, who presents today at suggestion of Mary Springer CNP, for evaluation of a nodule in the right palm.  This was first noticed about to months ago.  No particular trauma around time of onset.  It was just incidentally spotted.  It is not painful and does not itch.  No limitation on function.  No popping, catching, or subjective instability.  No similar masses elsewhere.  It has not particularly been changing in character.    She is not diabetic or hypothyroid.  " She does not smoke.  She has a glass of wine each day.      REVIEW OF SYSTEMS       A 30-item multi-system Review Of Systems was obtained on today's intake form.  This was reviewed with the patient and is correct.  The pertinent positives and negatives are listed above.  The form has been scanned separately into the medical record.      PHYSICAL EXAM    Constitutional:    Appears stated age. Well-developed and well-nourished female in no acute distress.  Psychiatric:         Pleasant normal mood and affect. Behavior is appropriate for the situation.   Head:                   Normocephalic and atraumatic.  Eyes:                    Pupils are equal and round.  Cardiovascular:  2+ radial and ulnar pulses. Fingers well-perfused.  Respiratory:        Effort normal. No respiratory distress. Speaking in complete sentences.  Neurologic:       Alert and oriented to person, place, and time.  Skin:                Skin is intact, warm and dry.  Hematologic / Lymphatic:    No lymphedema or lymphangitis.    Extremities / Musculoskeletal:                      Skin of the right hand and palm is intact with no erythema, ecchymosis, or diffuse swelling.  There is a 4 x 8 mm firm nodule with normal overlying skin at the palmar crease in the ring ray.  This is nontender, nonpulsatile, with no Tinel's sign.  It does not move with the underlying flexor tendon.  It does appear adherent to fascia, consistent with Dupuytren's nodule.  Full composite finger flexion extension with full intrinsic plus minus posture.  No blanching.  Symmetric wrist and forearm motion.  Sensation intact to light touch in all distributions.  Capillary refill less than 2 seconds.      IMAGING / LABS / EMGs           None pertinent      Past Medical History:   Diagnosis Date   • Anxiety 11/04/2023   • Awareness under anesthesia    • Bilateral hearing loss     Does not wear her hearing aids on a daily basis   • Frontal fibrosing alopecia 2024   • Fungal toenail  infection    • Left carpal tunnel syndrome 11/04/2023   • Paresthesia of left upper limb 11/04/2023   • Plantar wart, right foot    • Radiculitis of left cervical region 11/04/2023   • Subacute vaginitis 11/04/2023   • Vaginal atrophy        Medication Documentation Review Audit       Reviewed by Javy Herbert MD (Physician) on 11/29/24 at 0651      Medication Order Taking? Sig Documenting Provider Last Dose Status   aluminum chloride (Drysol) 20 % external solution [id number]  Apply topically once daily at bedtime. Graham Pereira MD  Active   calcium carbonate-vitamin D3 (Calcium 600 with Vitamin D3) 600 mg-12.5 mcg (500 unit) capsule [id number] No Take 1 tablet by mouth 2 times a day. For 90 days Graham Pereira MD Taking Active   cetirizine (ZyrTEC) 10 mg capsule [id number] No Take by mouth. Graham Pereira MD Taking Active   ciclopirox (Penlac) 8 % solution [id number] No Apply topically once daily at bedtime. Graham Pereira MD Taking Active   clobetasol (Temovate) 0.05 % ointment [id number] No Apply small amount to perineum or vulva for flare/itching daily for 1 week, then three times a week for 1 week. Sena Puga DO Taking Active   estradiol (Estrace) 0.01 % (0.1 mg/gram) vaginal cream [id number] No Insert applicatorful twice a week vaginally at bedtime twice a week. Sena Puga DO Taking Active   fluconazole (Diflucan) tablet 150 mg [id number]   Ger De Los Santos MD  Active   ketoconazole (NIZOral) 2 % cream [id number] No Apply 1 Application topically once daily. Historical MD Kelli Taking Active   multivit-min/iron/FA/vit K/lut (CENTRUM SILVER WOMEN ORAL) [id number] No Take 1 capsule by mouth once daily. Graham Pereira MD Taking Active   tretinoin (Retin-A) 0.1 % cream [id number]  Apply topically once daily at bedtime. Historical Provider, MD  Active                    Allergies   Allergen Reactions   • Wasp Venom Unknown       Social History     Socioeconomic History    • Marital status:      Spouse name: Not on file   • Number of children: Not on file   • Years of education: Not on file   • Highest education level: Not on file   Occupational History   • Occupation: Retired    Non profit Rep   Tobacco Use   • Smoking status: Never   • Smokeless tobacco: Never   Vaping Use   • Vaping status: Never Used   Substance and Sexual Activity   • Alcohol use: Yes     Alcohol/week: 7.0 standard drinks of alcohol     Types: 7 Glasses of wine per week   • Drug use: Not Currently     Types: Marijuana     Comment: ages 22 - 32   • Sexual activity: Yes     Partners: Male   Other Topics Concern   • Not on file   Social History Narrative   • Not on file     Social Drivers of Health     Financial Resource Strain: Not on file   Food Insecurity: No Food Insecurity (7/18/2024)    Hunger Vital Sign    • Worried About Running Out of Food in the Last Year: Never true    • Ran Out of Food in the Last Year: Never true   Transportation Needs: Not on file   Physical Activity: Not on file   Stress: Not on file   Social Connections: Not on file   Intimate Partner Violence: Not At Risk (4/24/2024)    Humiliation, Afraid, Rape, and Kick questionnaire    • Fear of Current or Ex-Partner: No    • Emotionally Abused: No    • Physically Abused: No    • Sexually Abused: No   Housing Stability: Not on file       Past Surgical History:   Procedure Laterality Date   • COCHLEAR IMPLANT Left 2024    Left osia implant Dr Michael Saavedra   • CYSTOSCOPY     • HYSTERECTOMY      age 50   ovaries and uterus   • MYOMECTOMY     • STAPEDECTOMY  1981    x3         Electronically Signed      JOHNNY Herbert MD      Orthopaedic Hand Surgery      [phone]

## 2024-11-29 NOTE — PROGRESS NOTES
"CHIEF COMPLAINT         Right palm nodule    ASSESSMENT + PLAN    Dupuytren's nodule in palm of right ring ray    I reviewed the nature of Dupuytren's disease and the intermittently progressive typical clinical course.  I discussed the multitude of treatment options including simple observation, formal open surgical cord excision, needle release, and Xiaflex enzyme injection along with the major risks, benefits, and durability of each option.    As there is currently no contracture or limitation on motion, there is no need for any formal intervention at this point.  I reviewed the \"tabletop test\" and other common functional limitations that should prompt a return to clinic, should they become apparent.  Continue with unrestricted activity.    Follow-up with any additional concerns.        HISTORY OF PRESENT ILLNESS       Patient is a 69 y.o. right-hand dominant female retiree, who presents today at suggestion of Mary Springer CNP, for evaluation of a nodule in the right palm.  This was first noticed about to months ago.  No particular trauma around time of onset.  It was just incidentally spotted.  It is not painful and does not itch.  No limitation on function.  No popping, catching, or subjective instability.  No similar masses elsewhere.  It has not particularly been changing in character.    She is not diabetic or hypothyroid.  She does not smoke.  She has a glass of wine each day.      REVIEW OF SYSTEMS       A 30-item multi-system Review Of Systems was obtained on today's intake form.  This was reviewed with the patient and is correct.  The pertinent positives and negatives are listed above.  The form has been scanned separately into the medical record.      PHYSICAL EXAM    Constitutional:    Appears stated age. Well-developed and well-nourished female in no acute distress.  Psychiatric:         Pleasant normal mood and affect. Behavior is appropriate for the situation.   Head:                   " Normocephalic and atraumatic.  Eyes:                    Pupils are equal and round.  Cardiovascular:  2+ radial and ulnar pulses. Fingers well-perfused.  Respiratory:        Effort normal. No respiratory distress. Speaking in complete sentences.  Neurologic:       Alert and oriented to person, place, and time.  Skin:                Skin is intact, warm and dry.  Hematologic / Lymphatic:    No lymphedema or lymphangitis.    Extremities / Musculoskeletal:                      Skin of the right hand and palm is intact with no erythema, ecchymosis, or diffuse swelling.  There is a 4 x 8 mm firm nodule with normal overlying skin at the palmar crease in the ring ray.  This is nontender, nonpulsatile, with no Tinel's sign.  It does not move with the underlying flexor tendon.  It does appear adherent to fascia, consistent with Dupuytren's nodule.  Full composite finger flexion extension with full intrinsic plus minus posture.  No blanching.  Symmetric wrist and forearm motion.  Sensation intact to light touch in all distributions.  Capillary refill less than 2 seconds.      IMAGING / LABS / EMGs           None pertinent      Past Medical History:   Diagnosis Date    Anxiety 11/04/2023    Awareness under anesthesia     Bilateral hearing loss     Does not wear her hearing aids on a daily basis    Frontal fibrosing alopecia 2024    Fungal toenail infection     Left carpal tunnel syndrome 11/04/2023    Paresthesia of left upper limb 11/04/2023    Plantar wart, right foot     Radiculitis of left cervical region 11/04/2023    Subacute vaginitis 11/04/2023    Vaginal atrophy        Medication Documentation Review Audit       Reviewed by Javy Herbert MD (Physician) on 11/29/24 at 0651      Medication Order Taking? Sig Documenting Provider Last Dose Status   aluminum chloride (Drysol) 20 % external solution [id number]  Apply topically once daily at bedtime. Historical Provider, MD  Active   calcium carbonate-vitamin D3 (Calcium  600 with Vitamin D3) 600 mg-12.5 mcg (500 unit) capsule [id number] No Take 1 tablet by mouth 2 times a day. For 90 days Graham Pereira MD Taking Active   cetirizine (ZyrTEC) 10 mg capsule [id number] No Take by mouth. Graham Pereira MD Taking Active   ciclopirox (Penlac) 8 % solution [id number] No Apply topically once daily at bedtime. Graham Pereira MD Taking Active   clobetasol (Temovate) 0.05 % ointment [id number] No Apply small amount to perineum or vulva for flare/itching daily for 1 week, then three times a week for 1 week. Sena Puga DO Taking Active   estradiol (Estrace) 0.01 % (0.1 mg/gram) vaginal cream [id number] No Insert applicatorful twice a week vaginally at bedtime twice a week. Sena Puga DO Taking Active   fluconazole (Diflucan) tablet 150 mg [id number]   Ger De Los Santos MD  Active   ketoconazole (NIZOral) 2 % cream [id number] No Apply 1 Application topically once daily. Graham Pereira MD Taking Active   multivit-min/iron/FA/vit K/lut (CENTRUM SILVER WOMEN ORAL) [id number] No Take 1 capsule by mouth once daily. Graham Pereira MD Taking Active   tretinoin (Retin-A) 0.1 % cream [id number]  Apply topically once daily at bedtime. Historical Provider, MD  Active                    Allergies   Allergen Reactions    Wasp Venom Unknown       Social History     Socioeconomic History    Marital status:      Spouse name: Not on file    Number of children: Not on file    Years of education: Not on file    Highest education level: Not on file   Occupational History    Occupation: Retired    Non profit Rep   Tobacco Use    Smoking status: Never    Smokeless tobacco: Never   Vaping Use    Vaping status: Never Used   Substance and Sexual Activity    Alcohol use: Yes     Alcohol/week: 7.0 standard drinks of alcohol     Types: 7 Glasses of wine per week    Drug use: Not Currently     Types: Marijuana     Comment: ages 22 - 32    Sexual activity: Yes     Partners: Male    Other Topics Concern    Not on file   Social History Narrative    Not on file     Social Drivers of Health     Financial Resource Strain: Not on file   Food Insecurity: No Food Insecurity (7/18/2024)    Hunger Vital Sign     Worried About Running Out of Food in the Last Year: Never true     Ran Out of Food in the Last Year: Never true   Transportation Needs: Not on file   Physical Activity: Not on file   Stress: Not on file   Social Connections: Not on file   Intimate Partner Violence: Not At Risk (4/24/2024)    Humiliation, Afraid, Rape, and Kick questionnaire     Fear of Current or Ex-Partner: No     Emotionally Abused: No     Physically Abused: No     Sexually Abused: No   Housing Stability: Not on file       Past Surgical History:   Procedure Laterality Date    COCHLEAR IMPLANT Left 2024    Left osia implant Dr Michael Saavedra    CYSTOSCOPY      HYSTERECTOMY      age 50   ovaries and uterus    MYOMECTOMY      STAPEDECTOMY  1981    x3         Electronically Signed      JOHNNY Herbert MD      Orthopaedic Hand Surgery      [phone]

## 2024-12-18 NOTE — PROGRESS NOTES
PCP: KAITY Caraballo-CNP (referred by)         CHIEF COMPLAINT: urinary incontinence         HISTORY OF PRESENT ILLNESS:  This is a 69 y.o. female,  who presents with urinary incontinence.     Vaginal atrophy  Pelvic pain  S/p menopause    Patient has just moved from the [place]     About two years ago, she was diagnosed with vaginal atrophy  She underwent PFPT for a year and a half (ended about 4 months ago)  She noted gradual bladder leakage that occurs with physical strain (sneezing, coughing, laughing, intercourse, exercising, etc)  Patient notes it is not a lot of urine   She reports having urgency but no leakage    Hysterectomy at 50 years old    Patient is back on estrogen cream  Patient is sexually active, reports little to no issue    Patient does report constipation  She does take pills or fiber gummies to help  Drinks 1.5 cups of coffee/daily  Drinks tea more frequent in the winter/afternoons    She does yoga and exercises    DTFx: 5x  NTFx: 1x      PHYSICAL EXAMINATION:  No LMP recorded. Patient has had a hysterectomy.  Body mass index is 21.52 kg/m².  Vitals:    24 1031   BP: 120/69   Pulse: 68   Temp: 36.3 °C (97.3 °F)     General Appearance: well appearing  Neuro: Alert and oriented     Pelvic:  Genitourinary: normal external genitalia, Bartholin's glands negative, Gallina's glands negative  Urethra: normal meatus, non-tender, no periurethral mass, mobility  Vaginal mucosa: no ulcerations or lesions  Cervix: surgically absent  Uterus: surgically absent  Bimanual exam: negative  Rectal: deferred  Pelvic floor muscle contraction  4/5  POP-Q (in supine position):       Aa: -3     Ba: -3     C: -7              Gh: 2.5     Pb: 3     TVL: 7              Ap: -3     Bp: -3     D: x    Cough did produce leakage       PVR (by Ultrasound): 0 mL   Urine dip: No results found for this or any previous visit (from the past 24 hours).      IMPRESSION AND PLAN:  Nichole ANNE Karyn is a 69 y.o. who  presents with mixed urinary incontinence.     Mixed urinary incontinence, predominant stress    We would like the patient to undergo urodynamic testing to better visualize how the patient's bladder functions during voiding and holding urine.     We discussed Bulkamid injections and mid-urethral sling with the patient. The risk and benefit of Bulkamid as well as mid-urethral sling were explained to the patient.     Injectable therapy is less invasive than the surgical placement of the mid-urethral sling. We discussed the success rate of mid-urethral sling at the 80 to 90%. We talked about the risk of the sling tightening around the urethra. We place the risks of urinary retention at 3%, overactive bladder at 6% and mesh-related complication at or less than 1%.  We discussed the success rate of Bulkamid at 60% improvement at 7 years. Patient was given information about the surgical management mainly at the advised patient that hysterectomy may have an effect on the urinary incontinence.     We advised the patient to be mindful about her PFPT- as she may become too tight in her pelvic floor and worsen her urinary leakage symptoms.     Follow up for UDS and results.     Scribe Attestation  By signing my name below, Kassidy PHILIP Scribe   attest that this documentation has been prepared under the direction and in the presence of Etienne Aguilera MD.

## 2024-12-19 ENCOUNTER — APPOINTMENT (OUTPATIENT)
Dept: UROLOGY | Facility: CLINIC | Age: 69
End: 2024-12-19
Payer: MEDICARE

## 2024-12-19 VITALS
SYSTOLIC BLOOD PRESSURE: 120 MMHG | HEART RATE: 68 BPM | DIASTOLIC BLOOD PRESSURE: 69 MMHG | WEIGHT: 125.4 LBS | BODY MASS INDEX: 21.52 KG/M2 | TEMPERATURE: 97.3 F

## 2024-12-19 DIAGNOSIS — R32 URINARY INCONTINENCE, UNSPECIFIED TYPE: ICD-10-CM

## 2024-12-19 DIAGNOSIS — N39.3 FEMALE STRESS INCONTINENCE: ICD-10-CM

## 2024-12-19 DIAGNOSIS — N39.3 INCONTINENCE OVERFLOW, STRESS FEMALE: ICD-10-CM

## 2024-12-19 DIAGNOSIS — N39.490 INCONTINENCE OVERFLOW, STRESS FEMALE: ICD-10-CM

## 2024-12-19 LAB
POC APPEARANCE, URINE: CLEAR
POC BILIRUBIN, URINE: NEGATIVE
POC BLOOD, URINE: NEGATIVE
POC COLOR, URINE: YELLOW
POC GLUCOSE, URINE: NEGATIVE MG/DL
POC KETONES, URINE: NEGATIVE MG/DL
POC LEUKOCYTES, URINE: NEGATIVE
POC NITRITE,URINE: NEGATIVE
POC PH, URINE: 7 PH
POC PROTEIN, URINE: NEGATIVE MG/DL
POC SPECIFIC GRAVITY, URINE: 1.01
POC UROBILINOGEN, URINE: 0.2 EU/DL

## 2024-12-19 PROCEDURE — G2211 COMPLEX E/M VISIT ADD ON: HCPCS | Performed by: UROLOGY

## 2024-12-19 PROCEDURE — 99204 OFFICE O/P NEW MOD 45 MIN: CPT | Performed by: UROLOGY

## 2024-12-19 PROCEDURE — 81003 URINALYSIS AUTO W/O SCOPE: CPT | Performed by: UROLOGY

## 2024-12-19 PROCEDURE — 51798 US URINE CAPACITY MEASURE: CPT | Performed by: UROLOGY

## 2024-12-19 PROCEDURE — 1159F MED LIST DOCD IN RCRD: CPT | Performed by: UROLOGY

## 2024-12-19 RX ORDER — ROFLUMILAST 3 MG/G
AEROSOL, FOAM TOPICAL
COMMUNITY
Start: 2024-09-26

## 2024-12-19 RX ORDER — MINOXIDIL 2.5 MG/1
2.5 TABLET ORAL DAILY
COMMUNITY
Start: 2024-12-06

## 2024-12-19 ASSESSMENT — ENCOUNTER SYMPTOMS
DEPRESSION: 0
OCCASIONAL FEELINGS OF UNSTEADINESS: 0
LOSS OF SENSATION IN FEET: 0

## 2025-01-03 ENCOUNTER — APPOINTMENT (OUTPATIENT)
Dept: UROLOGY | Facility: CLINIC | Age: 70
End: 2025-01-03
Payer: MEDICARE

## 2025-01-03 DIAGNOSIS — N39.46 MIXED INCONTINENCE: ICD-10-CM

## 2025-01-03 RX ORDER — TACROLIMUS 1 MG/G
OINTMENT TOPICAL 2 TIMES DAILY
COMMUNITY

## 2025-01-03 RX ORDER — CICLOPIROX 80 MG/ML
SOLUTION TOPICAL NIGHTLY
COMMUNITY

## 2025-01-03 NOTE — PROGRESS NOTES
Nichole Boss 69 y.o. female  Procedures:  Patient referred by Dr. Aguilera for urodynamics to evaluate mixed incontinence. Dr. Martin was present in office at time of study. Pre-uroflow was completed today with a pvr of 150 ml. Urine was clear for uti today. Patient did not leak on CLPP/VLPP. Patient did have DO prior to void. Pvr after study was 0 ml.  Patient instructed to increase fluids if she has any burning or blood in urine. Patient made aware she may have blood vaginally due to abdominal catheter insertion.  Patient understood and consented to urodynamics. Patient will follow up with Dr. Aguilera to review results. 01/03/2025/vicki

## 2025-01-06 ENCOUNTER — APPOINTMENT (OUTPATIENT)
Dept: UROLOGY | Facility: CLINIC | Age: 70
End: 2025-01-06
Payer: MEDICARE

## 2025-01-06 DIAGNOSIS — N39.3 FEMALE STRESS INCONTINENCE: Primary | ICD-10-CM

## 2025-01-06 NOTE — PROGRESS NOTES
CHIEF COMPLAINT:      A telephone visit (audio only) between the patient (at the originating site) and the provider (at the distant site) was utilized to provide this telehealth service.    Verbal consent was requested and obtained fromNAME@ on this date, [unfilled] , for a telehealth visit.     HISTORY OF PRESENT ILLNESS:      This is a  69 y.o. female who presents for follow-up for review of urodynamics .  Patient has symptoms of mixed urinary incontinence predominant stress.  She has failed pelvic floor physical therapy.  Patient was counseled on her visit with me on December 19 about option for management of stress urinary incontinence including sling surgery and Bulkamid.  She had urodynamic testing completed.  Interpretation of urodynamics, on uroflow patient voided a total of 116 cc with a peak flow of 20 cc/s, average flow of 7.3 cc/s and a continuous stream.  She left a residual of 150 cc in her bladder.  On CMG, patient had the first desire to void at 18 cc, strong desire at 171 cc, capacity of 262 cc.  She did have evidence of detrusor overactivity low pressure at 229 cc with no leakage.  At capacity she was given the permission to void she voided 405 cc with a peak flow of 19 cc/s and detrusor pressure maximum flow of 36 cm of water emptying her bladder completely.  She did have a post void detrusor contraction up to 189 cm of water.  This urodynamic failed to demonstrate stress urinary incontinence however it did show detrusor overactivity near capacity.  She is able to empty her bladder completely with a bell-shaped curve on pressure flow study with a relatively high detrusor pressure maximum flow.    IMPRESSION AND PLAN:       Nichole Boss is a 69 y.o. who presents with mixed urinary incontinence symptoms.  Clinically she has predominant stress symptoms.  Urodynamically stress incontinence was not reproduced.  There was evidence of low pressure detrusor overactivity.  Her capacity was normal.  Her  pressure flow study showed a normal peak flow with a mildly elevated detrusor pressure during flow and mildly increased EMG activity during the voiding phase with a post micturition detrusor contraction.  Due to the fact that her symptoms are predominantly stress urinary incontinence and the urodynamics failed to reproduce stress urinary incontinence.  We did discussed with the patient options.  We decided to have her return for office cystoscopy and cuff stress test with a full bladder.  Patient is in agreement with that plan.  Alternatively we can give her an overactive bladder medication and see how she does.  She was not interested in that option and based on my recommendation we proceeded with the option of Stress test with cystoscopy.  When she comes back if we demonstrate stress incontinence then we will discuss the options for management of incontinence including sling or injectable therapy as we have reviewed with her last time.        All questions and concerns were answered and addressed.  The patient expressed understanding and agrees with the plan.       SIGNATURES  Etienne Aguilera MD  [phone]

## 2025-01-20 ENCOUNTER — APPOINTMENT (OUTPATIENT)
Dept: OTOLARYNGOLOGY | Facility: CLINIC | Age: 70
End: 2025-01-20
Payer: MEDICARE

## 2025-02-10 ENCOUNTER — TELEPHONE (OUTPATIENT)
Dept: OBSTETRICS AND GYNECOLOGY | Facility: CLINIC | Age: 70
End: 2025-02-10
Payer: MEDICARE

## 2025-02-10 DIAGNOSIS — N95.8 GENITOURINARY SYNDROME OF MENOPAUSE: ICD-10-CM

## 2025-02-10 DIAGNOSIS — Z79.890 HORMONE REPLACEMENT THERAPY (POSTMENOPAUSAL): ICD-10-CM

## 2025-02-10 NOTE — TELEPHONE ENCOUNTER
Est pt last seen 4/2024 called into the office requesting to schedule her annual and also in need of Rx (Estradiol) vaginal cream.     Annual scheduled 5/19/2025    Pharmacy verified     Rx request sent to [facility]

## 2025-02-14 DIAGNOSIS — Z79.890 HORMONE REPLACEMENT THERAPY (POSTMENOPAUSAL): ICD-10-CM

## 2025-02-14 DIAGNOSIS — N95.8 GENITOURINARY SYNDROME OF MENOPAUSE: ICD-10-CM

## 2025-02-14 RX ORDER — ESTRADIOL 0.1 MG/G
CREAM VAGINAL
Qty: 42.5 G | Refills: 3 | Status: SHIPPED | OUTPATIENT
Start: 2025-02-14

## 2025-02-14 RX ORDER — ESTRADIOL 0.1 MG/G
CREAM VAGINAL
Qty: 42.5 G | Refills: 3 | Status: SHIPPED | OUTPATIENT
Start: 2025-02-14 | End: 2025-02-14 | Stop reason: WASHOUT

## 2025-02-24 ENCOUNTER — HOSPITAL ENCOUNTER (OUTPATIENT)
Dept: RADIOLOGY | Facility: CLINIC | Age: 70
Discharge: HOME | End: 2025-02-24
Payer: MEDICARE

## 2025-02-24 VITALS — WEIGHT: 120 LBS | HEIGHT: 67 IN | BODY MASS INDEX: 18.83 KG/M2

## 2025-02-24 DIAGNOSIS — Z12.31 ENCOUNTER FOR SCREENING MAMMOGRAM FOR MALIGNANT NEOPLASM OF BREAST: ICD-10-CM

## 2025-02-24 PROCEDURE — 77063 BREAST TOMOSYNTHESIS BI: CPT | Performed by: RADIOLOGY

## 2025-02-24 PROCEDURE — 77067 SCR MAMMO BI INCL CAD: CPT | Performed by: RADIOLOGY

## 2025-02-24 PROCEDURE — 77067 SCR MAMMO BI INCL CAD: CPT

## 2025-03-20 ENCOUNTER — APPOINTMENT (OUTPATIENT)
Dept: UROLOGY | Facility: CLINIC | Age: 70
End: 2025-03-20
Payer: MEDICARE

## 2025-04-25 NOTE — PROGRESS NOTES
HISTORY OF PRESENT ILLNESS:  69 y.o.with mixed urinary incontinence symptom stress predominant presenting for cystoscopic evaluation today 5/1/2025 . Last seen in Office [1/6/25]    The patient does not wear pads. Incontinence is with exercise or orgasm. DTF x5.      From Previous note 1/6/25   This is a  69 y.o. female who presents for follow-up for review of urodynamics .  Patient has symptoms of mixed urinary incontinence predominant stress.  She has failed pelvic floor physical therapy.  Patient was counseled on her visit with me on December 19 about option for management of stress urinary incontinence including sling surgery and Bulkamid.  She had urodynamic testing completed.  Interpretation of urodynamics, on uroflow patient voided a total of 116 cc with a peak flow of 20 cc/s, average flow of 7.3 cc/s and a continuous stream.  She left a residual of 150 cc in her bladder.  On CMG, patient had the first desire to void at 18 cc, strong desire at 171 cc, capacity of 262 cc.  She did have evidence of detrusor overactivity low pressure at 229 cc with no leakage.  At capacity she was given the permission to void she voided 405 cc with a peak flow of 19 cc/s and detrusor pressure maximum flow of 36 cm of water emptying her bladder completely.  She did have a post void detrusor contraction up to 189 cm of water.  This urodynamic failed to demonstrate stress urinary incontinence however it did show detrusor overactivity near capacity.  She is able to empty her bladder completely with a bell-shaped curve on pressure flow study with a relatively high detrusor pressure maximum flow.    ROS: 12pt ROS otherwise negative unless stated above in HPI      PHYSICAL EXAMINATION:  No LMP recorded. Patient has had a hysterectomy.  Body mass index is 18.79 kg/m².  Visit Vitals  /72   Pulse 67   Wt 54.4 kg (120 lb)   BMI 18.79 kg/m²   OB Status Hysterectomy   Smoking Status Never   BSA 1.6 m²     General Appearance: well  appearing  Neuro: Alert and oriented     Pelvic:  Genitourinary:  normal external genitalia, Bartholin's glands negative, [place]'s glands negative  Urethra:   normal meatus, non-tender, no periurethral mass  Vagina: mucosa  normal  Cervix:  normal  Uterus:  normal size, nontender  Adnexae:  negative nontender, no masses      Urine dip:   Recent Results (from the past 6 hours)   POCT UA Automated manually resulted    Collection Time: 05/01/25  3:29 PM   Result Value Ref Range    POC Color, Urine Yellow Straw, Yellow, Light-Yellow    POC Appearance, Urine Clear Clear    POC Glucose, Urine NEGATIVE NEGATIVE mg/dl    POC Bilirubin, Urine NEGATIVE NEGATIVE    POC Ketones, Urine TRACE (A) NEGATIVE mg/dl    POC Specific Gravity, Urine 1.020 1.005 - 1.035    POC Blood, Urine NEGATIVE NEGATIVE    POC PH, Urine 6.0 No Reference Range Established PH    POC Protein, Urine NEGATIVE NEGATIVE mg/dl    POC Urobilinogen, Urine 0.2 0.2, 1.0 EU/DL    Poc Nitrite, Urine NEGATIVE NEGATIVE    POC Leukocytes, Urine SMALL (1+) (A) NEGATIVE     Patient ID: Nichole Boss is a 69 y.o. female.    Procedures      PROCEDURE NOTE:     OPERATION:  Flexible Cystourethroscopy     SURGEON: Etienne Aguilera MD     ANESTHESIA:  2% lidocaine jelly     COMPLICATIONS:  None     SPECIMEN:  Voided urine was not collected and submitted for cytology.     Estimated Blood Loss: Minimal      Complications: None      Patient presented for cystoscopy. They were brought to the procedure room, they were consented, the patient was prepped in normal fashion and placed on the cystobed.     A flexible scope was inserted and the entire urethra and bladder were examined.? A complete cystoscopy was performed. There were no mucosal lesions noted. The ureteral orifices were in normal location.  Mild trabeculation noted throughout. No tumors or stones noted. Area of resection of left bladder wall with no overlying mucosal changes.      CST positive with full bladder. When  urethra is supported the leakage stops.     The patient tolerated the procedure well. There were no complications. Routine post-cystoscopy instructions were given.    IMPRESSION AND PLAN:  Nichole Boss is a 69 y.o. with mixed urinary incontinence symptom stress predominant presenting for cystoscopic evaluation today 1/5/2025    CST positive with full bladder. When urethra is supported the leakage stops. Offered treatment options. The patient would like to try PFPT before trying a Bulkamid injection procedure or sling. We discussed the risks, benefits, and alternatives to each procedure. We also discussed the postop care and recovery expectations.  Referral for PFPT.       Scribe Attestation  By signing my name below, IElo Scribe attest that this documentation has been prepared under the direction and in the presence of Etienne Aguilera MD. All medical record entries made by the Scribe were at my direction or personally dictated by me. I have reviewed the chart and agree that the record accurately reflects my personal performance of the history, physical exam, discussion and plan.

## 2025-04-29 RX ORDER — LIDOCAINE HYDROCHLORIDE 20 MG/ML
1 JELLY TOPICAL ONCE
Status: SHIPPED | OUTPATIENT
Start: 2025-05-01

## 2025-05-01 ENCOUNTER — APPOINTMENT (OUTPATIENT)
Dept: UROLOGY | Facility: CLINIC | Age: 70
End: 2025-05-01
Payer: MEDICARE

## 2025-05-01 VITALS
WEIGHT: 120 LBS | BODY MASS INDEX: 18.79 KG/M2 | DIASTOLIC BLOOD PRESSURE: 72 MMHG | SYSTOLIC BLOOD PRESSURE: 126 MMHG | HEART RATE: 67 BPM

## 2025-05-01 DIAGNOSIS — N39.3 FEMALE STRESS INCONTINENCE: ICD-10-CM

## 2025-05-01 LAB
POC APPEARANCE, URINE: CLEAR
POC BILIRUBIN, URINE: NEGATIVE
POC BLOOD, URINE: NEGATIVE
POC COLOR, URINE: YELLOW
POC GLUCOSE, URINE: NEGATIVE MG/DL
POC KETONES, URINE: ABNORMAL MG/DL
POC LEUKOCYTES, URINE: ABNORMAL
POC NITRITE,URINE: NEGATIVE
POC PH, URINE: 6 PH
POC PROTEIN, URINE: NEGATIVE MG/DL
POC SPECIFIC GRAVITY, URINE: 1.02
POC UROBILINOGEN, URINE: 0.2 EU/DL

## 2025-05-01 PROCEDURE — 81003 URINALYSIS AUTO W/O SCOPE: CPT | Performed by: UROLOGY

## 2025-05-01 PROCEDURE — 99213 OFFICE O/P EST LOW 20 MIN: CPT | Performed by: UROLOGY

## 2025-05-01 PROCEDURE — 52000 CYSTOURETHROSCOPY: CPT | Performed by: UROLOGY

## 2025-05-06 ENCOUNTER — EVALUATION (OUTPATIENT)
Dept: PHYSICAL THERAPY | Facility: CLINIC | Age: 70
End: 2025-05-06
Payer: MEDICARE

## 2025-05-06 DIAGNOSIS — M62.89 PELVIC FLOOR DYSFUNCTION IN FEMALE: Primary | ICD-10-CM

## 2025-05-06 DIAGNOSIS — N39.3 FEMALE STRESS INCONTINENCE: ICD-10-CM

## 2025-05-06 PROCEDURE — 97161 PT EVAL LOW COMPLEX 20 MIN: CPT | Mod: GP

## 2025-05-06 PROCEDURE — 97110 THERAPEUTIC EXERCISES: CPT | Mod: GP

## 2025-05-06 PROCEDURE — 97535 SELF CARE MNGMENT TRAINING: CPT | Mod: GP

## 2025-05-06 ASSESSMENT — PAIN SCALES - GENERAL: PAINLEVEL_OUTOF10: 0 - NO PAIN

## 2025-05-06 ASSESSMENT — PAIN - FUNCTIONAL ASSESSMENT: PAIN_FUNCTIONAL_ASSESSMENT: 0-10

## 2025-05-06 NOTE — PROGRESS NOTES
Physical Therapy    Physical Therapy Evaluation    Patient Name: Nichole Boss  MRN: [id number]  Today's Date: 2025   confirmed verbally by patient.    Time Entry:   Time Calculation  Start Time: 1510  Stop Time: 1600  Time Calculation (min): 50 min  PT Evaluation Time Entry  PT Evaluation (Low) Time Entry: 20  PT Therapeutic Procedures Time Entry  Therapeutic Exercise Time Entry: 15  Self-Care/Home Mgmt Training: 15                    Reason for Visit:  Referred by: Etienne gAuilera MD (2025)   Referral DX: Female stress incontinence [N39.3]     Insurance:  Visit: #1  Authorized: to be determined  Payor/Plan:  [employer] MEDICARE [facility] MEDICARE   Certification Period: 2025 to 2025    Current Problem  1. Pelvic floor dysfunction in female        2. Female stress incontinence  Referral to Physical Therapy          Subjective   Date of Onset: ongoing, recent worsening last 3 months  Chief Complaint: urinary incontinence, pain with intercourse    Nichole is a 70-year-old female presenting to PT with reports urinary stress incontinence, just finished a test with Dr. Aguilera which confirmed urinary stress incontinence presence. Discussed potential surgical and Botox (bulkamid injection) to treat incontinence if PFPT does not improve symptoms conservatively.      during COVID time, stopped taking esterase and stopped intercourse. Since then has found a new partner and wanting to be sexually activity again.     Hysterectomy 50 years old- no breast cancer hx.  Constipation - stool softeners, fiber gummiest.   About - thought I had UTI- they think it was a kidney stone.    Did pelvic PT in the past for painful intercourse, which helped a lot, still have pelvic wand at home.   Active with yoga. Hiking, cycling, [place] - club pilates   Etienne helm- supposed to see her in a month. Want to get back on estrogen/testerone compound cream.     Bladder Screen:  Urinary void 5x/day,  "0x/night  4-5 cups of tea and soup/day, then water as well  Cough/sneeze leakage, small amount into underwear  Not wearing pads  Leakage with intercourse as well    Bowel Screen:  Frequent constipation- pretty well managed  Fiber supplements  Doesn't use stool or squatty potty   No fecal leakage noted    Sexual Health Screen:  Hx of pain with intercourse, sexually active  Not as much pain with intercourse recently, certain positions better than others  Responds well to use of pelvic wand and internal stretching      Patient stated goals for treatment: \"Hope to strengthen muscles\"    Reviewed medical screening history form with patient: Yes.      Barriers Impacting Care:  None.    Precautions:  Precautions  STEADI Fall Risk Score (The score of 4 or more indicates an increased risk of falling): 0  Precautions Comment: hx of      Pain:  Pain Assessment: 0-10  0-10 (Numeric) Pain Score: 0 - No pain    Objective   Range of Motion:   Pelvic Floor/ Perineal AROM:  PFM Contraction: Present  PFM Relaxation: Present Full  PFM Bulge: Present    Hip:  Bilateral WNL    TA Contraction/Abdominals: Able    Strength:   Patient provides explicit verbal consent during internal and external hands on examination today. Continual consent explained. Patient understands.     Internal Pelvic Floor Strength Assessment: 3/5, 4 second endurance hold, 2 quick flicks- then unable to continue    Palpation:   Patient provides explicit verbal consent during internal and external hands on examination today. Continual consent explained. Patient understands.     Internal Palpation Female:   Levator Ani: +TTP higher tone right side, left WNL  Obturator Internus: +TTP higher tone right side, left WNL  Vaginal Vault: mild tenderness with initial insertion along posterior fourchette of vagina, discomfort quickly subsided    Special Tests:  Cough Test: No Change, No leakage      TREATMENT  Educated patient on course of treatment.     SELF CARE: x 1 (15 " minutes)  Discussed use of squatty potty or stool for correct alignment of puborectalis during BM for ease of bowel evacuation.   Reviewed potential benefits of utilizing stim or biofeedback in future for better engagement of these muscles.   Reviewed use of pelvic wand at home 1-2x/week to release tight muscles/fascia, provided hand out of pelvic clock.   Recommended continued use of OhNUT at home.   Patient may benefit from additional discussion with doctor next time she see's them about getting back onto estrogen/testosterone compound cream.     THERAPEUTIC EXERCISE: x 1 (15 minutes)  Introduced:  See home program below.     Access Code: [email or web address]  URL: [email or web address]  Date: 05/06/2025  Prepared by: Denisse Samayoa    Home Exercises: (all with gentle TA and PF contraction)   - Standing Hip Abduction with Bent Knee (red band around knees)  - 2 x daily - 7 x weekly - 2 sets - 10 reps  - Sit to Stand with Resistance Around Legs (red band around thighs) - 2 x daily - 7 x weekly - 2 sets - 10 reps  - Heel raise with pelvic contraction (with exhale) - 2 x daily - 7 x weekly - 2 sets - 10 reps  - Use of pelvic wand 1-2x/week for lengthening muscles    Patient verbalizes and demonstrates understanding of home exercises. Patient will contact writer if with questions or if condition worsens. Provided patient with  email.     Charges: [id number], [id number], [id number]      Outcome Measures:  Other Measures  Other Outcome Measures: Female GUPI: xx/43     Assessment  PT Diagnosis: (05/06/2025)  Patient presents with pelvic pain, weakness, and impaired coordination of the pelvic floor and surrounding hip musculature, associated with signs and symptoms consistent with genitourinary syndrome and poor length tension relationship of pelvic floor muscles. She also reports ongoing stress urinary incontinence and a history of dyspareunia, all of which significantly impact her functional mobility and quality of life.  Skilled physical therapy is medically necessary to address these impairments, support neuromuscular re-education, and facilitate a return to her prior level of function. The patient is an excellent candidate for skilled intervention to optimize pelvic floor function, reduce symptoms, and enhance overall functional outcomes.     Plan  Treatment/Interventions: Aquatic therapy, Biofeedback, Blood flow restriction therapy, Cryotherapy, Dry needling, Education/ Instruction, Electrical stimulation, Hot pack, Gait training, Manual therapy, Neuromuscular re-education, Self care/ home management, Taping techniques, Therapeutic activities, Therapeutic exercises  PT Plan: Skilled PT  PT Frequency: 1 time per week  Duration: 12 weeks  Certification Period Start Date: 05/06/25  Certification Period End Date: 08/04/25  Rehab Potential: Good  Plan of Care Agreement: Patient    Next Visit:  Progress as appropriate  Biofeedback/Real time ultra sound      Goals:  Patient will report no pain with intercourse in 8 weeks, improving quality of life.  Patient will report 0 incidence of leakage with ADLs and IADLs in 12 weeks.   Patient will report Female GUPI score of < in 12 weeks indicating improved function and quality of life.  Patient will be independent with HEP.

## 2025-05-19 ENCOUNTER — OFFICE VISIT (OUTPATIENT)
Dept: OBSTETRICS AND GYNECOLOGY | Facility: CLINIC | Age: 70
End: 2025-05-19
Payer: MEDICARE

## 2025-05-19 VITALS
WEIGHT: 121.6 LBS | DIASTOLIC BLOOD PRESSURE: 71 MMHG | HEIGHT: 64 IN | SYSTOLIC BLOOD PRESSURE: 151 MMHG | BODY MASS INDEX: 20.76 KG/M2

## 2025-05-19 DIAGNOSIS — Z12.11 COLON CANCER SCREENING: ICD-10-CM

## 2025-05-19 DIAGNOSIS — Z12.31 ENCOUNTER FOR SCREENING MAMMOGRAM FOR MALIGNANT NEOPLASM OF BREAST: ICD-10-CM

## 2025-05-19 DIAGNOSIS — N95.2 VAGINAL ATROPHY: ICD-10-CM

## 2025-05-19 DIAGNOSIS — Z01.419 WELL WOMAN EXAM: Primary | ICD-10-CM

## 2025-05-19 DIAGNOSIS — N95.8 GENITOURINARY SYNDROME OF MENOPAUSE: ICD-10-CM

## 2025-05-19 DIAGNOSIS — N76.3 SUBACUTE VULVITIS: ICD-10-CM

## 2025-05-19 PROCEDURE — 1159F MED LIST DOCD IN RCRD: CPT | Performed by: OBSTETRICS & GYNECOLOGY

## 2025-05-19 PROCEDURE — 1126F AMNT PAIN NOTED NONE PRSNT: CPT | Performed by: OBSTETRICS & GYNECOLOGY

## 2025-05-19 PROCEDURE — 99397 PER PM REEVAL EST PAT 65+ YR: CPT | Performed by: OBSTETRICS & GYNECOLOGY

## 2025-05-19 PROCEDURE — 1036F TOBACCO NON-USER: CPT | Performed by: OBSTETRICS & GYNECOLOGY

## 2025-05-19 PROCEDURE — 3008F BODY MASS INDEX DOCD: CPT | Performed by: OBSTETRICS & GYNECOLOGY

## 2025-05-19 RX ORDER — ESTRADIOL 4 UG/1
1 INSERT VAGINAL 2 TIMES WEEKLY
Qty: 24 EACH | Refills: 3 | Status: SHIPPED | OUTPATIENT
Start: 2025-05-19 | End: 2025-05-19

## 2025-05-19 RX ORDER — ESTRADIOL 10 UG/1
1 INSERT VAGINAL 2 TIMES WEEKLY
Qty: 24 EACH | Refills: 3 | Status: SHIPPED | OUTPATIENT
Start: 2025-05-19 | End: 2025-05-21 | Stop reason: SDUPTHER

## 2025-05-19 RX ORDER — ESTRADIOL 4 UG/1
1 INSERT VAGINAL 2 TIMES WEEKLY
Qty: 24 EACH | Refills: 3 | Status: SHIPPED | OUTPATIENT
Start: 2025-05-19 | End: 2025-05-19 | Stop reason: ALTCHOICE

## 2025-05-19 ASSESSMENT — LIFESTYLE VARIABLES
HOW MANY STANDARD DRINKS CONTAINING ALCOHOL DO YOU HAVE ON A TYPICAL DAY: 1 OR 2
AUDIT-C TOTAL SCORE: 3
HOW OFTEN DO YOU HAVE A DRINK CONTAINING ALCOHOL: 2-3 TIMES A WEEK
SKIP TO QUESTIONS 9-10: 1
HOW OFTEN DO YOU HAVE SIX OR MORE DRINKS ON ONE OCCASION: NEVER

## 2025-05-19 ASSESSMENT — ENCOUNTER SYMPTOMS
OCCASIONAL FEELINGS OF UNSTEADINESS: 0
LOSS OF SENSATION IN FEET: 0
DEPRESSION: 0

## 2025-05-19 ASSESSMENT — PAIN SCALES - GENERAL: PAINLEVEL_OUTOF10: 0-NO PAIN

## 2025-05-19 NOTE — PROGRESS NOTES
ESTABLISHED ANNUAL GYN VISIT     Patient Name:  Nichole Boss  :  1955  MR #:  [id number]  Acct #:  [id number]      ASSESSMENT/PLAN:     Assessment & Plan  Well woman exam       Next annual in 1 year  Encounter for screening mammogram for malignant neoplasm of breast    Orders:    BI mammo bilateral screening; Future    Imvexxy Maintenance Pack 10 mcg insert; Insert 1 Insert into the vagina 2 times a week.    Colon cancer screening       Due in 1 year  Genitourinary syndrome of menopause    Orders:    Imvexxy Maintenance Pack 10 mcg insert; Insert 1 Insert into the vagina 2 times a week.    Vaginal atrophy    Orders:    Imvexxy Maintenance Pack 10 mcg insert; Insert 1 Insert into the vagina 2 times a week.    Subacute vulvitis       Use Coconut oil for moisturizing the skin several days a week. May also use for lubrication       Counseling:  Medication education:  Education:  All new and/or current medications discussed and reviewed including side effects with patient/caregiver, Understanding:  Caregiver/Patient expressed understanding., Adherence:  Barriers to adherence identified and discussed if present,     OB/GYN Preventive:     - Pap smear is not indicated in women over the age of 65 unless there is history of cervical or vaginal cancer, or other applicable high risk status.    - Self breast exam monthly and clinical breast examination yearly discussed - Next Mammogram due 2026  - Screening colonoscopy recommended starting age 45, then Q3-10 years depending on testing and family history - Next screen due  or . Dr. Hou or partner  - Osteoporosis prevention discussion included vit d3/calcium supplements, weight-bearing exercise - DEXA scan due    - Genitourinary skin hygiene discussed.  - Diet/Weight management discussed.    Follow-up: 1 year or sooner if needed.      Chief Complaint:  Annual exam    HPI:  Nichole Boss is a 70 y.o.  female who presents for pelvic and breast exam.  She  uses clobetasol as needed.  She is still using Estrace internally.  No complaints of vaginal issues but still having complaints of external genital sensitivity following sex.  She has been using a lubricant.  Sex is uncomfortable sometimes.  She has seen Dr. Aguilera, diagnosed with FABIANA.  She is starting physical therapy to start with.    GYNH:   MENARCHE:     MENOPAUSE:  Sexual activity Yes. Coitarche Yes.  HRT History Yes.    Medical History[1]    Surgical History[2]    Social History[3]     Family History[4]    OB History          0    Para   0    Term   0       0    AB   0    Living   0         SAB   0    IAB   0    Ectopic   0    Multiple   0    Live Births   0                  Prior to Admission medications    Medication Sig Start Date End Date Taking? Authorizing Provider   aluminum chloride (Drysol) 20 % external solution Apply topically once daily at bedtime.   Yes Historical Provider, MD   calcium carbonate-vitamin D3 (Calcium 600 with Vitamin D3) 600 mg-12.5 mcg (500 unit) capsule Take 1 tablet by mouth 2 times a day. For 90 days 23  Yes Historical Provider, MD   cetirizine (ZyrTEC) 10 mg capsule Take by mouth.   Yes Historical Provider, MD   ciclopirox (Penlac) 8 % solution Apply topically once daily at bedtime.   Yes Historical Provider, MD   estradiol (Estrace) 0.01 % (0.1 mg/gram) vaginal cream Insert applicatorful twice a week vaginally at bedtime twice a week. 25  Yes Sena Puga,    ketoconazole (NIZOral) 2 % cream Apply 1 Application topically once daily. 24  Yes Historical Provider, MD   minoxidil (Loniten) 2.5 mg tablet Take 1 tablet (2.5 mg) by mouth once daily. Take 1/2 tablet daily 24  Yes Historical Provider, MD   multivit-min/iron/FA/vit K/lut (CENTRUM SILVER WOMEN ORAL) Take 1 capsule by mouth once daily.   Yes Historical Provider, MD   tacrolimus (Protopic) 0.1 % ointment Apply topically 2 times a day.   Yes Historical Provider, MD   tretinoin  "(Retin-A) 0.1 % cream Apply topically once daily at bedtime.   Yes Historical Provider, MD   clobetasol (Temovate) 0.05 % ointment Apply small amount to perineum or vulva for flare/itching daily for 1 week, then three times a week for 1 week.  Patient taking differently: if needed. Apply small amount to perineum or vulva for flare/itching daily for 1 week, then three times a week for 1 week. 4/25/24   Sena Puga, DO   Zoryve 0.3 % foam if needed. 9/26/24   Historical Provider, MD       Allergies[5]      ROS:   [facility] - WOMEN ONLY:          Breast Lump No acute changes.  Vasomotor sxs no  Painful [place] no.  Vaginal Discharge no.     [facility] - ROS Update:          Unexplained Weight Change no.  Pain anywhere in your body no.  Black or bloody stools no.  Problems with urination no.  Rashes or sores no.  Sexual problems no.  Depression or anxiety problems no.  Do you feel threatened by anyone No.      OBJECTIVE:   /71   Ht 1.626 m (5' 4\")   Wt 55.2 kg (121 lb 9.6 oz)   BMI 20.87 kg/m²   Body mass index is 20.87 kg/m².     Physical Exam  GENERAL:   General Appearance:  well-developed, well-nourished, no functional handicap, well-groomed.  Hygiene:  good.  Ill-appearance:  none.  Mental Status:  alert and oriented. Speech:  clear.  Eye contact:  normal.  Appears stated age:  yes.     LUNGS:  Effort:  no respiratory distress.    HEART:  HRRR with S1/S2 w/o M/C/R  BREASTS: General:  no masses, no tenderness, no skin changes, no nipple abnormality, and no axillary lymphadenopathy.   ABDOMEN: Tenderness:  none.  Distention:  none.   GENITOURINARY - FEMALE: Bladder:  normal.  Pelvic support defects:  not seen .  External genitalia: Dryness  Urethra:  Normal.  Vagina:  no lesions, hypoestrogenic changes. Adnexa:  no palpable masses, non tender.   DERMATOLOGY:  Skin:  normal.   EXTREMITIES: Normal:  no anomalies.  Edema:  none.    NEUROLOGICAL: Orientation:  alert and oriented x 3.   PSYCHOLOGY: Affect:  " appropriate.  Mood:  pleasant.    Labs reviewed: Yes -     Imaging reviewed:  Yes - mammogram and dexa      Note: This dictation was generated using Dragon voice recognition software. Please excuse any grammatical or spelling errors that may have occurred using the system.             [1]   Past Medical History:  Diagnosis Date    Anxiety 11/04/2023    Awareness under anesthesia     Bilateral hearing loss     Does not wear her hearing aids on a daily basis    Fibroid 1992    Frontal fibrosing alopecia 2024    Fungal toenail infection     Left carpal tunnel syndrome 11/04/2023    Paresthesia of left upper limb 11/04/2023    Plantar wart, right foot     Radiculitis of left cervical region 11/04/2023    Subacute vaginitis 11/04/2023    TMJ dysfunction 2000    Urinary incontinence 2024    Vaginal atrophy    [2]   Past Surgical History:  Procedure Laterality Date    COCHLEAR IMPLANT Left 2024    Left osia implant Dr Michael Saavedra    CYSTOSCOPY      HYSTERECTOMY  October 2004    age 50   ovaries and uterus    MYOMECTOMY      STAPEDECTOMY  1981    x3   [3]   Social History  Tobacco Use    Smoking status: Never    Smokeless tobacco: Never   Vaping Use    Vaping status: Never Used   Substance Use Topics    Alcohol use: Yes     Alcohol/week: 7.0 standard drinks of alcohol     Types: 7 Glasses of wine per week    Drug use: Not Currently     Types: Marijuana     Comment: ages 22 - 32   [4]   Family History  Problem Relation Name Age of Onset    Heart disease Mother Kenyatta Alonso     Stroke Mother Kenyatta Alonso     Lymphoma Father 94     Blood Disorder Father 94     Cancer Father 94     Vision loss Father 94     Leukemia Brother [facility]     Cancer Brother [facility]     Blood Disorder Brother [facility]     Vision loss Brother [facility]     Parkinsonism Brother     [5]   Allergies  Allergen Reactions    Wasp Venom Unknown

## 2025-05-20 ENCOUNTER — TELEPHONE (OUTPATIENT)
Dept: OBSTETRICS AND GYNECOLOGY | Facility: CLINIC | Age: 70
End: 2025-05-20
Payer: MEDICARE

## 2025-05-20 DIAGNOSIS — N95.8 GENITOURINARY SYNDROME OF MENOPAUSE: ICD-10-CM

## 2025-05-20 DIAGNOSIS — N95.2 VAGINAL ATROPHY: ICD-10-CM

## 2025-05-20 DIAGNOSIS — Z12.31 ENCOUNTER FOR SCREENING MAMMOGRAM FOR MALIGNANT NEOPLASM OF BREAST: ICD-10-CM

## 2025-05-20 NOTE — TELEPHONE ENCOUNTER
[facility] calling stating sig and quantity for Imvexxy Starter pack was sent in incorrectly. Please send new rx with updated sig of insert capsule in vagina once daily for 2 wks and then twice weekly after that with a quantity of 18.

## 2025-05-20 NOTE — ASSESSMENT & PLAN NOTE
Orders:    Imvexxy Maintenance Pack 10 mcg insert; Insert 1 Insert into the vagina 2 times a week.

## 2025-05-21 RX ORDER — ESTRADIOL 10 UG/1
INSERT VAGINAL
Qty: 18 EACH | Refills: 11 | Status: SHIPPED | OUTPATIENT
Start: 2025-05-22

## 2025-05-28 ENCOUNTER — APPOINTMENT (OUTPATIENT)
Dept: PHYSICAL THERAPY | Facility: CLINIC | Age: 70
End: 2025-05-28
Payer: MEDICARE

## 2025-05-28 ENCOUNTER — TREATMENT (OUTPATIENT)
Dept: PHYSICAL THERAPY | Facility: CLINIC | Age: 70
End: 2025-05-28
Payer: MEDICARE

## 2025-05-28 DIAGNOSIS — N39.3 FEMALE STRESS INCONTINENCE: ICD-10-CM

## 2025-05-28 DIAGNOSIS — M62.89 PELVIC FLOOR DYSFUNCTION IN FEMALE: ICD-10-CM

## 2025-05-28 PROCEDURE — 97535 SELF CARE MNGMENT TRAINING: CPT | Mod: GP

## 2025-05-28 PROCEDURE — 97112 NEUROMUSCULAR REEDUCATION: CPT | Mod: GP

## 2025-05-28 ASSESSMENT — PAIN - FUNCTIONAL ASSESSMENT: PAIN_FUNCTIONAL_ASSESSMENT: 0-10

## 2025-05-28 ASSESSMENT — PAIN SCALES - GENERAL: PAINLEVEL_OUTOF10: 0 - NO PAIN

## 2025-05-28 NOTE — PROGRESS NOTES
Physical Therapy    Physical Therapy Treatment    Patient Name: Nichole Boss  MRN: [id number]  Today's Date: 2025    Time Entry:   Time Calculation  Start Time: 1050  Stop Time: 1148  Time Calculation (min): 58 min     PT Therapeutic Procedures Time Entry  Neuromuscular Re-Education Time Entry: 30  Self-Care/Home Mgmt Trainin                   Reason for Visit:  Referred by: Etienne Aguilera MD (2025)   Referral DX: Female stress incontinence [N39.3]      Insurance:  Visit: #2  Authorized: to be determined  Payor/Plan:  UNITED [employer] MEDICARE UNITED HEALTHCARE MEDICARE   Certification Period: 2025 to 2025       Current Problem  1. Female stress incontinence  Follow Up In Physical Therapy      2. Pelvic floor dysfunction in female  Follow Up In Physical Therapy          Subjective    Date of Onset: ongoing, recent worsening last 3 months  Chief Complaint: urinary incontinence, pain with intercourse     Been doing exercises and phone med bridge salomón is helpful.   Pelvic wand was used once the past few weeks, but I am engaging in intercourse for some stretching. Initial entrance/penetration is what is painful with intercourse, it doesn't stop me from having intercourse but it can be really painful.     My OB changed me to a new medication, a vaginal suppository estrogen supplement and then recommended use of coconut oil which I have tried a few times for lubrication for intercourse. Not making a huge difference.     No episodes of stress incontinence,   Two weeks from today going on sister trip to [place] - where there will be tons of laughing so that will likely be a test.     Precautions  Precautions  STEADI Fall Risk Score (The score of 4 or more indicates an increased risk of falling): 0  Precautions Comment: None  Pain  Pain Assessment  Pain Assessment: 0-10  0-10 (Numeric) Pain Score: 0 - No pain    Objective   Oblique dominance - with TA engagement seen on real time ultrasound  "machine- improves with cues.    TREATMENT  Educated patient on course of treatment.     SELF CARE: x 2 (28 minutes)  Discussed use of coconut oil as recommended by OBGYN, provided education about additional products available such as DAMIVA WEAVER or DAMIVA Lisandra Labia Balm. All certified 100% natural products, safe as recommended by pelvic PT and OBGYNs. She may follow up with OBGYN to see if this over the counter product is okay for her.   Reviewed role of transverse abdominus and connection to pelvic floor for support and continence.    NEURO RE-EDUCATION: x 2 (30 minutes)  Reviewed correct isolation/activation of TA with real time ultra sound machine. Progressed with exhale from supine to hook lying to a core march with good form. Unable to progress to straight leg raise. Core march with alternating legs completed 2 x 15. Cues such as \"let naval or belly button drop down gentle on exhale\" worked well for patient.   Then progressed review of TA activation in standing for more functional position, TA engagement standing with glute set 2 x 10 reps.      Access Code: [email or web address]  URL: [email or web address]  Date: 05/28/2025  Prepared by: Denisse Samayoa    Home Exercises  - Seated Shoulder Flexion Towel Slide  - 1 x daily - 7 x weekly - 1 sets - 15 reps  - Seated Elbow Extension and Shoulder External Rotation AAROM at Table with Towel  - 1 x daily - 7 x weekly - 1 sets - 15 reps  - Circular Shoulder Pendulum with Table Support  - 1 x daily - 7 x weekly - 1 sets - 15 reps  - Seated Scapular Retraction  - 1 x daily - 7 x weekly - 1 sets - 15 reps  - Standing Isometric Shoulder External Rotation with Doorway  - 1 x daily - 7 x weekly - 2 sets - 10 reps - 5' hold hold  - Seated Upper Trapezius Stretch  - 1 x daily - 7 x weekly - 2 sets - 10 reps  - Use of pelvic wand 1-2x/week for lengthening muscles     Patient verbalizes and demonstrates understanding of home exercises. Patient will contact writer if with " questions or if condition worsens. Provided patient with  email.      Charges: [id number], [id number]       Assessment  PT Diagnosis: (05/06/2025)  Patient continues to present with mild stress incontinence and signs of genitourinary syndrome consistent with vaginal atrophy and menopausal side effects. She has good ongoing management from OB with prescriptions to address non MSK symptoms. With physical therapy she is progressing exercise well into standing functional exercises. Able to do some fast heel raised with pelvic contraction and a cough today without a sneeze. Benefits and responds well to education and neuro education of how to isolate transverse abdominus muscle as a synergist muscle to support he pelvic floor. Continues to benefit from skilled care.      Plan  Next Visit:  Progress as appropriate  Biofeedback/Real time ultra sound        Goals:  Patient will report no pain with intercourse in 8 weeks, improving quality of life.  Patient will report 0 incidence of leakage with ADLs and IADLs in 12 weeks.   Patient will report Female GUPI score of < in 12 weeks indicating improved function and quality of life.  Patient will be independent with HEP.

## 2025-06-03 ENCOUNTER — TELEPHONE (OUTPATIENT)
Dept: OBSTETRICS AND GYNECOLOGY | Facility: CLINIC | Age: 70
End: 2025-06-03
Payer: MEDICARE

## 2025-06-03 DIAGNOSIS — N95.2 VAGINAL ATROPHY: ICD-10-CM

## 2025-06-03 DIAGNOSIS — N95.8 GENITOURINARY SYNDROME OF MENOPAUSE: ICD-10-CM

## 2025-06-03 DIAGNOSIS — Z12.31 ENCOUNTER FOR SCREENING MAMMOGRAM FOR MALIGNANT NEOPLASM OF BREAST: ICD-10-CM

## 2025-06-03 NOTE — TELEPHONE ENCOUNTER
Pt called and reported that she needs her Imvexxy sent to another pharmacy because it is too expensive with [facility]. Her insurance stated it is covered at tier 3 pricing for the maintenance pack, but starter pack is quite expensive. She is asking that it be sent elsewhere or if can't get at $45.00 a month rate would like to go back to the Estradiol. Please advise

## 2025-06-04 ENCOUNTER — APPOINTMENT (OUTPATIENT)
Dept: PHYSICAL THERAPY | Facility: CLINIC | Age: 70
End: 2025-06-04
Payer: MEDICARE

## 2025-06-05 RX ORDER — ESTRADIOL 10 UG/1
INSERT VAGINAL
Qty: 24 EACH | Refills: 3 | Status: SHIPPED | OUTPATIENT
Start: 2025-06-05

## 2025-06-05 NOTE — TELEPHONE ENCOUNTER
Please let the patient know that the maintenance therapy prescription for IMVEXXY was sent to [facility] retail pharmacy.  They will assist with preauthorization or apply a coupon for her.    She may call [phone] end of this week to follow-up.

## 2025-06-06 ENCOUNTER — PATIENT MESSAGE (OUTPATIENT)
Dept: OBSTETRICS AND GYNECOLOGY | Facility: CLINIC | Age: 70
End: 2025-06-06
Payer: MEDICARE

## 2025-06-11 ENCOUNTER — APPOINTMENT (OUTPATIENT)
Dept: PHYSICAL THERAPY | Facility: CLINIC | Age: 70
End: 2025-06-11
Payer: MEDICARE

## 2025-06-12 ENCOUNTER — TREATMENT (OUTPATIENT)
Dept: PHYSICAL THERAPY | Facility: CLINIC | Age: 70
End: 2025-06-12
Payer: MEDICARE

## 2025-06-12 DIAGNOSIS — M62.89 PELVIC FLOOR DYSFUNCTION IN FEMALE: ICD-10-CM

## 2025-06-12 DIAGNOSIS — N39.3 FEMALE STRESS INCONTINENCE: ICD-10-CM

## 2025-06-12 PROCEDURE — 97110 THERAPEUTIC EXERCISES: CPT | Mod: GP

## 2025-06-12 PROCEDURE — 20560 NDL INSJ W/O NJX 1 OR 2 MUSC: CPT | Mod: GP

## 2025-06-12 ASSESSMENT — PAIN SCALES - GENERAL: PAINLEVEL_OUTOF10: 0 - NO PAIN

## 2025-06-12 ASSESSMENT — PAIN - FUNCTIONAL ASSESSMENT: PAIN_FUNCTIONAL_ASSESSMENT: 0-10

## 2025-06-12 NOTE — PROGRESS NOTES
Physical Therapy    Physical Therapy Treatment    Patient Name: Nichole Boss  MRN: [id number]  Today's Date: 6/12/2025    Time Entry:   Time Calculation  Start Time: 1052  Stop Time: 1130  Time Calculation (min): 38 min     PT Therapeutic Procedures Time Entry  Therapeutic Exercise Time Entry: 23  Needle Insertion w/o Injection 1 or 2: 15                   Reason for Visit:  Referred by: Etienne Aguilera MD (5/1/2025)   Referral DX: Female stress incontinence [N39.3]      Insurance:  Visit: #3  Authorized: to be determined  Payor/Plan:  UNITED HEALTHCARE MEDICARE UNITED HEALTHCARE MEDICARE   Certification Period: 05/06/2025 to 08/04/2025       Current Problem  1. Female stress incontinence  Follow Up In Physical Therapy      2. Pelvic floor dysfunction in female  Follow Up In Physical Therapy          Subjective    Date of Onset: ongoing, recent worsening last 3 months  Chief Complaint: urinary incontinence, pain with intercourse     Been doing exercises. They have been going okay.   Did have a leak when I was sleeping, which hardly happens ever. Only 1x though.   1x having sex leakage- caught it thought before more came out.  Laughing and talking so much on trip to [place] and I didn't have any leakage.   Wondering what to expect with time line of how long it would take me to see even more results.   Haven't been using pelvic wand but I am having intercourse which is stretching.  Still trying coconut oil at home for vaginal moisture and lubrication as recommended by .  Having very tight hips recently - wondering if I should get dry needled again - helped me in the past    Precautions:  Precautions  STEADI Fall Risk Score (The score of 4 or more indicates an increased risk of falling): 0  Precautions Comment: None  Pain:  Pain Assessment  Pain Assessment: 0-10  0-10 (Numeric) Pain Score: 0 - No pain    Objective   Oblique dominance - with TA engagement seen on real time ultrasound machine- improves with  cues.    Myofascial trigger points revealed in the [right and left glute med and glute max] muscle(s) by dry needle technique with noted needle fibrillation, local twitch response, reproduction of symptoms including but not limited to aching, burning and electricity. Noted are atypical tissue morphology characteristics of abnormal density, palpable margins, contracted/fibrotic muscle and fascial tissue with resistance to penetration. These characteristics reflect abnormal tissue function, innervation and nervous system communication.      TREATMENT  Educated patient on course of treatment.     DRY NEEDLING 1-2 Muscles: x 1 (15 minutes)  Patient was educated on risks and benefits associated with dry needling, what to expect, and post-procedure protocol (i.e. increased water intake, increased stretching, etc.). Patient was also educated on modifications to HEP.  Verbal consent was received to proceed with treatment.    Trigger point needling was performed to the following muscles (name muscles). All trigger/tender points were marked and noted. Patient was prepped with 70% Isopropyl alcohol to the site(s). Sterile, single use, solid filament needles were inserted at various depths and angles to release tight tissue, improve microcirculation and remove neuro-noxious chemicals via a myofascial twitch response.      Patient in prone lying, draped properly  .3x75 mm to right upper trap x2    THERAPEUTIC EXERCISE: x 2 (23 minutes)   Introduced-   Kneeling hip flexor stretch; 30' hold, 10 x each leg  Seated hip internal and external rotation hip openers; hip switch; 20 reps  Quadruped rock and return with PF contraction on return; 15 reps  Standing mini jump with counter support and PFM squeeze; 10 reps    Access Code: [email or web address]  URL: [email or web address]  Date: 06/12/2025  Prepared by: Denisse Samayoa    Home Exercises:  - Standing Hip Abduction with Bent Knee  - 2 x daily - 7 x weekly - 2 sets - 10  reps  - Sit to Stand with Resistance Around Legs  - 2 x daily - 7 x weekly - 2 sets - 10 reps  - Heel raise with pelvic contraction  - 2 x daily - 7 x weekly - 2 sets - 10 reps  - Core March Transverse Abdominus   - 1 x daily - 7 x weekly - 2 sets - 10 reps  - Standing Transverse Abdominis Contraction  - 1 x daily - 7 x weekly - 2 sets - 10 reps  - kneeling hip flexor stretch with abduction  - 1 x daily - 7 x weekly - 2 sets - 10 reps  - Quadruped Rock Back (BKA)  - 1 x daily - 7 x weekly - 2 sets - 10 reps  - Seated Combined Hip Internal and External Rotation AROM: Hip Switch  - 1 x daily - 7 x weekly - 2 sets - 10 reps  - Jumping with Pelvic Floor Contraction  - 1 x daily - 7 x weekly - 2 sets - 10 reps     Patient verbalizes and demonstrates understanding of home exercises. Patient will contact writer if with questions or if condition worsens. Provided patient with  email.      Charges: [id number], [id number]     Assessment  Patient responds favorably to dry needling with twitch response and relief. Has some mild soreness afterwards which is to be expected. Responds well with positive feedback to stretches introduced today. Patient continues to be challenged by engaging pelvic floor with cough or jump. Benefits from skilled care.       Plan  Progress as appropriate  FU about DN  FU about new exercises  Biofeedback/Real time ultra sound        Goals:  Patient will report no pain with intercourse in 8 weeks, improving quality of life.  Patient will report 0 incidence of leakage with ADLs and IADLs in 12 weeks.   Patient will report Female GUPI score of < in 12 weeks indicating improved function and quality of life.  Patient will be independent with HEP.

## 2025-06-16 ENCOUNTER — APPOINTMENT (OUTPATIENT)
Dept: PRIMARY CARE | Facility: CLINIC | Age: 70
End: 2025-06-16
Payer: MEDICARE

## 2025-06-16 VITALS
HEART RATE: 59 BPM | OXYGEN SATURATION: 100 % | SYSTOLIC BLOOD PRESSURE: 112 MMHG | HEIGHT: 64 IN | DIASTOLIC BLOOD PRESSURE: 60 MMHG | BODY MASS INDEX: 20.83 KG/M2 | WEIGHT: 122 LBS

## 2025-06-16 DIAGNOSIS — R53.83 OTHER FATIGUE: ICD-10-CM

## 2025-06-16 DIAGNOSIS — M85.80 OSTEOPENIA, UNSPECIFIED LOCATION: ICD-10-CM

## 2025-06-16 DIAGNOSIS — E55.9 VITAMIN D DEFICIENCY: ICD-10-CM

## 2025-06-16 DIAGNOSIS — Z79.899 LONG TERM USE OF DRUG: ICD-10-CM

## 2025-06-16 DIAGNOSIS — Z96.21 COCHLEAR IMPLANT IN PLACE: ICD-10-CM

## 2025-06-16 DIAGNOSIS — M81.0 SENILE OSTEOPOROSIS: ICD-10-CM

## 2025-06-16 DIAGNOSIS — E78.00 ELEVATED LDL CHOLESTEROL LEVEL: ICD-10-CM

## 2025-06-16 DIAGNOSIS — H02.402 PTOSIS OF LEFT EYELID: ICD-10-CM

## 2025-06-16 DIAGNOSIS — N95.8 GENITOURINARY SYNDROME OF MENOPAUSE: ICD-10-CM

## 2025-06-16 DIAGNOSIS — B35.1 ONYCHOMYCOSIS: ICD-10-CM

## 2025-06-16 DIAGNOSIS — Z00.00 HEALTH CARE MAINTENANCE: ICD-10-CM

## 2025-06-16 DIAGNOSIS — Z00.00 ENCOUNTER FOR MEDICARE ANNUAL WELLNESS EXAM: Primary | ICD-10-CM

## 2025-06-16 PROBLEM — E78.49 OTHER HYPERLIPIDEMIA: Status: RESOLVED | Noted: 2023-11-04 | Resolved: 2025-06-16

## 2025-06-16 PROCEDURE — 1036F TOBACCO NON-USER: CPT | Performed by: NURSE PRACTITIONER

## 2025-06-16 PROCEDURE — 1170F FXNL STATUS ASSESSED: CPT | Performed by: NURSE PRACTITIONER

## 2025-06-16 PROCEDURE — 3008F BODY MASS INDEX DOCD: CPT | Performed by: NURSE PRACTITIONER

## 2025-06-16 PROCEDURE — 1159F MED LIST DOCD IN RCRD: CPT | Performed by: NURSE PRACTITIONER

## 2025-06-16 PROCEDURE — 1158F ADVNC CARE PLAN TLK DOCD: CPT | Performed by: NURSE PRACTITIONER

## 2025-06-16 PROCEDURE — 1160F RVW MEDS BY RX/DR IN RCRD: CPT | Performed by: NURSE PRACTITIONER

## 2025-06-16 PROCEDURE — 99397 PER PM REEVAL EST PAT 65+ YR: CPT | Performed by: NURSE PRACTITIONER

## 2025-06-16 PROCEDURE — G0439 PPPS, SUBSEQ VISIT: HCPCS | Performed by: NURSE PRACTITIONER

## 2025-06-16 PROCEDURE — 1123F ACP DISCUSS/DSCN MKR DOCD: CPT | Performed by: NURSE PRACTITIONER

## 2025-06-16 ASSESSMENT — ACTIVITIES OF DAILY LIVING (ADL)
TAKING_MEDICATION: INDEPENDENT
DRESSING: INDEPENDENT
MANAGING_FINANCES: INDEPENDENT
BATHING: INDEPENDENT
DOING_HOUSEWORK: INDEPENDENT
GROCERY_SHOPPING: INDEPENDENT

## 2025-06-16 ASSESSMENT — PATIENT HEALTH QUESTIONNAIRE - PHQ9
1. LITTLE INTEREST OR PLEASURE IN DOING THINGS: NOT AT ALL
2. FEELING DOWN, DEPRESSED OR HOPELESS: NOT AT ALL
SUM OF ALL RESPONSES TO PHQ9 QUESTIONS 1 & 2: 0

## 2025-06-16 NOTE — PATIENT COMMUNICATION
Est patient calling back to let you know Medicare does not cover Imvexxy so she would like to go back to the generic estradiol tablets she was on before the cream. She would like them sent to [facility] in [place].

## 2025-06-16 NOTE — PROGRESS NOTES
Subjective   Patient ID: Nichole Boss is a 70 y.o. female who presents for Full exam and MWV.    HPI     Diet:  mix of healthy and unhealthy   Water per day:   Nicotine: denies  Alcohol: 1 per day  Caffeine: 10 per week  Exercise: 4-6 times per week   Mammogram: 2/24/25  Dexa: 5/18/23 osteopenia , ordered   Colonoscopy: 12/18/2017 [facility] [place], operated by [facility] Dr Jamshid Calvo repeat 2027   Pap/Pelvic: Radical hysterectomy 2006     Dupuytren's nodule in palm of right ring ray: Ortho Dr Javy Herbert  No follow up needed      Right great toe onychomycosis: Pod Dr Doug Lyles   Wants another referall      7/3/24 Left osia implant (cochlear) Dr Michael Saavedra   Audiologist Radha Arreola      Genitourinary Syndrome of Menopause: Dr Sena Puga   Med: Imvexxy (not covered by ins)  Is going to ask for an alternative       Stress urinary incontinence: Urology Dr Etienne Aguilera   Just started Pelvic floor therapy      Rosacea, Frontal fibrosing Alopecia, basal cell carcinoma:   Derm Dr Gayle Pemberton   Med:      H/o elevated LDL    Onychomycosis: Great toe  Requesting a referral to podiatry     Left eye lid drooping  Requesting a referral for blepharoplasty     Osteopenia:   Dexa 5/2023   Med: Calcium and Vit D     Medical History[1]  Surgical History[2]  Social History     Socioeconomic History    Marital status: Significant Other     Spouse name: Carlos Corona    Number of children: Not on file    Years of education: Not on file    Highest education level: Not on file   Occupational History    Occupation: Retired    Non profit Rep   Tobacco Use    Smoking status: Never    Smokeless tobacco: Never   Vaping Use    Vaping status: Never Used   Substance and Sexual Activity    Alcohol use: Yes     Alcohol/week: 7.0 standard drinks of alcohol     Types: 7 Glasses of wine per week    Drug use: Never     Types: Marijuana     Comment: ages 22 - 32    Sexual activity: Yes     Partners: Male     Birth control/protection: Female  "Sterilization   Other Topics Concern    Not on file   Social History Narrative    Not on file     Social Drivers of Health     Financial Resource Strain: Not on file   Food Insecurity: No Food Insecurity (7/18/2024)    Hunger Vital Sign     Worried About Running Out of Food in the Last Year: Never true     Ran Out of Food in the Last Year: Never true   Transportation Needs: Not on file   Physical Activity: Not on file   Stress: Not on file   Social Connections: Not on file   Intimate Partner Violence: Not At Risk (5/19/2025)    Humiliation, Afraid, Rape, and Kick questionnaire     Fear of Current or Ex-Partner: No     Emotionally Abused: No     Physically Abused: No     Sexually Abused: No   Housing Stability: Not on file     Family History[3]      Review of Systems    Objective   /60   Pulse 59   Ht 1.626 m (5' 4\")   Wt 55.3 kg (122 lb)   SpO2 100%   BMI 20.94 kg/m²     Physical Exam    Gen: Alert and oriented x3 female in no acute distress.  HEENT: Head is normocephalic.  Neck is supple without carotid bruits  Heart: Regular rate and rhythm without murmurs.  Lungs: Clear to auscultation bilaterally.  Breast:         Deferred to Gyn  Pelvic:           Deferred to Gyn   Abdomen: Soft with normal bowel sounds.  No masses or pain to palpation.   Extremities: Good range of motion of all joints.  No significant edema. Pedal pulses +1-2/4  Neuro: No signs of focal neurologic deficit.  No tremor.  Speech normal.  DTRs +3/4;  Muscle Strength +5/5.  Musculoskeletal: Spine with good ROM.  Leg lengths are equal.  Skin: No significant or irregular nevi visualized.  Psych: normal affect.  No suicidal ideation.  Good judgment and insight.      Assessment/Plan     1. Encounter for Medicare annual wellness exam (Primary)    2. Health care maintenance    3. Cochlear implant in place  - Referral to ENT; Future  Continue follow up with audiologist     4. Ptosis of left eyelid  Due for eye exam in July. She will speak with " eye specialist first and then update with any orders needed.     5. Genitourinary syndrome of menopause  Follow-up with specialist per their discretion/direction.     6. Elevated LDL cholesterol level  - Comprehensive metabolic panel; Future  - CBC; Future  - Lipid panel; Future  - TSH with reflex to Free T4 if abnormal; Future  - Comprehensive metabolic panel  - CBC  - Lipid panel  - TSH with reflex to Free T4 if abnormal    7. Vitamin D deficiency  Vitamin D lab ordered. If your level is low,  a script for a weekly dose of Vitamin D will be sent to pharmacy. You should start or continue a Multivitamin.    - Vitamin D 25-Hydroxy,Total (for eval of Vitamin D levels); Future  - Vitamin D 25-Hydroxy,Total (for eval of Vitamin D levels)    8. Osteopenia, unspecified location    Patient should start/continue taking Calcium 1200 mg and Vit D  daily with food. Perform balance training, strength training and weight bearing exercises. Cessation of smoking and moderation of intake of alcohol, caffeine and carbonated beverages are recommended.     - XR DEXA bone density; Future    9. Onychomycosis  - Referral to Podiatry; Future    Follow up: 6/17/26 Full exam and V    Medications refills will be completed as discussed.     Any labs or testing that is ordered will be reviewed and the results will be in your chart .   You can review these via  TweetUp.     Prescriptions will not be filled unless you are compliant with your follow-up appointments or have a follow-up appointment scheduled as per the instruction of your provider. Refills for medications should be requested at the time of your office visit.     Please allow one week for refill requests to be completed.     Contact office with any questions or concerns.   Preferred communication is via Evino      Call [employer] Services: [phone] to assist with scheduling.      Mary Springer APRN-[facility]  [address]  [place] [address]  Phone: [phone]    **Charting was completed using voice recognition technology and may include unintended errors**              [1]   Past Medical History:  Diagnosis Date    Anxiety 11/04/2023    Awareness under anesthesia     Bilateral hearing loss     Does not wear her hearing aids on a daily basis    Fibroid 1992    Frontal fibrosing alopecia 2024    Fungal toenail infection     Left carpal tunnel syndrome 11/04/2023    Paresthesia of left upper limb 11/04/2023    Plantar wart, right foot     Radiculitis of left cervical region 11/04/2023    Subacute vaginitis 11/04/2023    TMJ dysfunction 2000    Urinary incontinence 2024    Vaginal atrophy    [2]   Past Surgical History:  Procedure Laterality Date    COCHLEAR IMPLANT Left 2024    Left osia implant Dr Michael Saavedra    CYSTOSCOPY      HYSTERECTOMY  October 2004    age 50   ovaries and uterus    MYOMECTOMY      STAPEDECTOMY  1981    x3   [3]   Family History  Problem Relation Name Age of Onset    Heart disease Mother Kenyatta Alonso     Stroke Mother Kenyatta Celeste     Lymphoma Father Christian Haddad     Blood Disorder Father Christian Washington     Cancer Father Christian Washington     Vision loss Father Christian Haddad     Leukemia Brother [facility]     Cancer Brother [facility]     Blood Disorder Brother [facility]     Vision loss Brother [facility]     Parkinsonism Brother

## 2025-06-18 ENCOUNTER — APPOINTMENT (OUTPATIENT)
Dept: PHYSICAL THERAPY | Facility: CLINIC | Age: 70
End: 2025-06-18
Payer: MEDICARE

## 2025-06-20 ENCOUNTER — HOSPITAL ENCOUNTER (OUTPATIENT)
Dept: RADIOLOGY | Facility: CLINIC | Age: 70
Discharge: HOME | End: 2025-06-20
Payer: MEDICARE

## 2025-06-20 DIAGNOSIS — M81.0 SENILE OSTEOPOROSIS: ICD-10-CM

## 2025-06-20 DIAGNOSIS — M85.80 OSTEOPENIA, UNSPECIFIED LOCATION: ICD-10-CM

## 2025-06-20 PROCEDURE — 77080 DXA BONE DENSITY AXIAL: CPT

## 2025-06-23 ENCOUNTER — CLINICAL SUPPORT (OUTPATIENT)
Dept: AUDIOLOGY | Facility: CLINIC | Age: 70
End: 2025-06-23
Payer: MEDICARE

## 2025-06-23 DIAGNOSIS — H90.6 MIXED CONDUCTIVE AND SENSORINEURAL HEARING LOSS OF BOTH EARS: Primary | ICD-10-CM

## 2025-06-23 PROCEDURE — 92567 TYMPANOMETRY: CPT

## 2025-06-23 PROCEDURE — 92557 COMPREHENSIVE HEARING TEST: CPT

## 2025-06-23 ASSESSMENT — PAIN - FUNCTIONAL ASSESSMENT: PAIN_FUNCTIONAL_ASSESSMENT: 0-10

## 2025-06-23 ASSESSMENT — PAIN SCALES - GENERAL: PAINLEVEL_OUTOF10: 0 - NO PAIN

## 2025-06-23 NOTE — LETTER
June 23, 2025     Mary Springer, APRN-CNP  [address] [address] [address]    Patient: Nichole Boss   YOB: 1955   Date of Visit: 6/23/2025       Dear KAITY Carr-CNP:    Thank you for referring Nichole Boss to me for evaluation. Below are my notes for this consultation.  If you have questions, please do not hesitate to call me. I look forward to following your patient along with you.       Sincerely,     Radha Arreola, BIJAN, CCC-A      CC: No Recipients  ______________________________________________________________________________________    AUDIOLOGIC EVALUATION AND OSIA CHECK  Name: Nichole Boss  YOB: 1955  MRN: [id number]  Age: 70 y.o.    Date of Evaluation:  6/23/2025     History:  Nichole Boss, 70 y.o., was seen today for her annual hearing evaluation and OSIA check. Nichole was implanted on by Dr. Saavedra on 7/3/24. She reported the following:    - She has concerns that the magnet strength is not appropriate, as it no longer causes pain/discomfort.   - She would like a new retention line added to the OSIA processor. She expressed frustrations that the hair clip is black, as this does not blend into her hair well.   - The OSIA processor will heat up after wearing it for several hours under a bicycle helmet.  - She has concerns for skin breakdown over the implant, as it is intermittently wet/scabbed over.   - She gets overwhelmed in crowds. Changes to the processor using the OSIA Smart Josep help with this.   - She does not wear her right hearing aid. She feels that she does well just using the OSIA.    Previous audiologic evaluation on 9/11/2023 revealed an essentially mild mixed hearing loss in the right ear and an essentially mild sloping to severe mixed hearing loss in the left ear. Word recognition abilities were measured to be excellent bilaterally. Tympanograms were type A (normal) in both ears.      IMPLANT  : Spiral Gateway  Implant:  [id number]  Surgeon: Dr. Saavedra  Surgery Date: 7/3/24    SOUND PROCESSOR  Model: OSIA2  SN: [id number] (new device)    Evaluation:    Otoscopy  Clear canals bilaterally, following cerumen removal in the right ear by this clinician.    Tympanometry  Right ear: Type A, normal ear canal volume and compliance.  Left ear: Type A, normal ear canal volume and compliance.     Acoustic Reflexes  Right ear: Could not maintain seal  Left ear: Could not maintain seal    Audiometric Evaluation  Right ear: essentially mild mixed hearing loss. Word recognition ability estimated to be excellent(100%) at 80 dB HL based on an NU-6 recorded 25-word list.  Left ear: moderately-severe rising to mild from 500 - 2000 Hz sloping to profound mixed hearing loss. Word recognition ability estimated to be good(88%) at 80 dB HL based on an NU-6 recorded 25-word list.    When compared to previous test results of 9/11/2023, air conduction thresholds are stable with the exception of decreased hearing sensitivity at 8000 Hz in the right ear only. Bone conduction thresholds are improved in the left ear from 2000 - 4000 Hz.     The test results were discussed with the patient.     OSIA Check  Visual inspection of the implant did not show redness or irritation over the magnet site. Patient indicated that the device stays on with the current magnet strength. An increase in magnet strength is not recommended. There is visible crusting anterior to the implant. Patient was encouraged to schedule a follow-up with Dr. Alarcon to address this, as Dr. Saavedra has left .     A new retention line was added. A white clip was used.     Patient was re-oriented to the use of the OSIA salomón.     Impressions  Today's evaluation revealed an essentially mild mixed hearing loss in the right ear and a moderately-severe rising to mild from 500 - 2000 Hz sloping to profound mixed hearing loss in the left ear. Word recognition abilities were measured to be excellent in the  right ear and good in the left ear. Tympanograms were type A (normal) in both ears.    Patient's OSIA concerns were addressed. It was recommended that she establish care with Dr. Alarcon.    All patient questions were answered.     Recommendations  - Continue medical follow-up with established providers   - Continue medical follow-up with Dr. Alarcon  - Re-test hearing annually  - Schedule OSIA/BAHA checks as needed    Time: 6356-4319    BIJAN Segal, CCC-A  Licensed Audiologist

## 2025-06-23 NOTE — PROGRESS NOTES
AUDIOLOGIC EVALUATION AND OSIA CHECK  Name: Nichole Boss  YOB: 1955  MRN: [id number]  Age: 70 y.o.    Date of Evaluation:  6/23/2025     History:  Nichole Boss, 70 y.o., was seen today for her annual hearing evaluation and OSIA check. Nichole was implanted on by Dr. Saavedra on 7/3/24. She reported the following:    - She has concerns that the magnet strength is not appropriate, as it no longer causes pain/discomfort.   - She would like a new retention line added to the OSIA processor. She expressed frustrations that the hair clip is black, as this does not blend into her hair well.   - The OSIA processor will heat up after wearing it for several hours under a bicycle helmet.  - She has concerns for skin breakdown over the implant, as it is intermittently wet/scabbed over.   - She gets overwhelmed in crowds. Changes to the processor using the OSIA Smart Josep help with this.   - She does not wear her right hearing aid. She feels that she does well just using the OSIA.    Previous audiologic evaluation on 9/11/2023 revealed an essentially mild mixed hearing loss in the right ear and an essentially mild sloping to severe mixed hearing loss in the left ear. Word recognition abilities were measured to be excellent bilaterally. Tympanograms were type A (normal) in both ears.      IMPLANT  : Cochlear [employer]  Implant: [id number]  Surgeon: Dr. Saavedra  Surgery Date: 7/3/24    SOUND PROCESSOR  Model: OSIA2  SN: [id number] (new device)    Evaluation:    Otoscopy  Clear canals bilaterally, following cerumen removal in the right ear by this clinician.    Tympanometry  Right ear: Type A, normal ear canal volume and compliance.  Left ear: Type A, normal ear canal volume and compliance.     Acoustic Reflexes  Right ear: Could not maintain seal  Left ear: Could not maintain seal    Audiometric Evaluation  Right ear: essentially mild mixed hearing loss. Word recognition ability estimated to be excellent(100%)  at 80 dB HL based on an NU-6 recorded 25-word list.  Left ear: moderately-severe rising to mild from 500 - 2000 Hz sloping to profound mixed hearing loss. Word recognition ability estimated to be good(88%) at 80 dB HL based on an NU-6 recorded 25-word list.    When compared to previous test results of 9/11/2023, air conduction thresholds are stable with the exception of decreased hearing sensitivity at 8000 Hz in the right ear only. Bone conduction thresholds are improved in the left ear from 2000 - 4000 Hz.     The test results were discussed with the patient.     OSIA Check  Visual inspection of the implant did not show redness or irritation over the magnet site. Patient indicated that the device stays on with the current magnet strength. An increase in magnet strength is not recommended. There is visible crusting anterior to the implant. Patient was encouraged to schedule a follow-up with Dr. Alarcon to address this, as Dr. Saavedra has left .     A new retention line was added. A white clip was used.     Patient was re-oriented to the use of the OSIA salomón.     Impressions  Today's evaluation revealed an essentially mild mixed hearing loss in the right ear and a moderately-severe rising to mild from 500 - 2000 Hz sloping to profound mixed hearing loss in the left ear. Word recognition abilities were measured to be excellent in the right ear and good in the left ear. Tympanograms were type A (normal) in both ears.    Patient's OSIA concerns were addressed. It was recommended that she establish care with Dr. Alarcon.    All patient questions were answered.     Recommendations  - Continue medical follow-up with established providers   - Continue medical follow-up with Dr. Alarcon  - Re-test hearing annually  - Schedule OSIA/BAHA checks as needed    Time: 4921-3124    BIJAN Segal, CCC-A  Licensed Audiologist

## 2025-06-26 ENCOUNTER — APPOINTMENT (OUTPATIENT)
Dept: PHYSICAL THERAPY | Facility: CLINIC | Age: 70
End: 2025-06-26
Payer: MEDICARE

## 2025-07-01 ENCOUNTER — TREATMENT (OUTPATIENT)
Dept: PHYSICAL THERAPY | Facility: CLINIC | Age: 70
End: 2025-07-01
Payer: MEDICARE

## 2025-07-01 DIAGNOSIS — N39.3 FEMALE STRESS INCONTINENCE: ICD-10-CM

## 2025-07-01 DIAGNOSIS — M62.89 PELVIC FLOOR DYSFUNCTION IN FEMALE: ICD-10-CM

## 2025-07-01 PROCEDURE — 97110 THERAPEUTIC EXERCISES: CPT | Mod: GP

## 2025-07-01 ASSESSMENT — PAIN SCALES - GENERAL: PAINLEVEL_OUTOF10: 0 - NO PAIN

## 2025-07-01 ASSESSMENT — PAIN - FUNCTIONAL ASSESSMENT: PAIN_FUNCTIONAL_ASSESSMENT: 0-10

## 2025-07-24 ENCOUNTER — APPOINTMENT (OUTPATIENT)
Facility: CLINIC | Age: 70
End: 2025-07-24
Payer: MEDICARE

## 2025-07-24 VITALS — WEIGHT: 122 LBS | BODY MASS INDEX: 20.94 KG/M2

## 2025-07-24 DIAGNOSIS — H61.22 IMPACTED CERUMEN OF LEFT EAR: ICD-10-CM

## 2025-07-24 DIAGNOSIS — H90.6 MIXED HEARING LOSS, BILATERAL: ICD-10-CM

## 2025-07-24 DIAGNOSIS — H80.93 OTOSCLEROSIS OF BOTH EARS: Primary | ICD-10-CM

## 2025-07-24 PROCEDURE — 99213 OFFICE O/P EST LOW 20 MIN: CPT | Performed by: OTOLARYNGOLOGY

## 2025-07-24 PROCEDURE — 1036F TOBACCO NON-USER: CPT | Performed by: OTOLARYNGOLOGY

## 2025-07-24 PROCEDURE — 69210 REMOVE IMPACTED EAR WAX UNI: CPT | Performed by: OTOLARYNGOLOGY

## 2025-07-24 PROCEDURE — 1159F MED LIST DOCD IN RCRD: CPT | Performed by: OTOLARYNGOLOGY

## 2025-07-24 NOTE — PROGRESS NOTES
Chief Complaint: hearing loss  Referred by:  Dr. Saavedra     HISTORY OF PRESENT ILLNESS:  This is a 71 yo female who presents today for follow-up of a right Osia. Device placed by Dr. Saavedra on 7/2/24. She lost one external processor secondary to magnet issues. Current concern is some sore skin around the device.    Significant otologic history including 3-4 left stapes surgeries. The first was at the age of 25 in [place], [place]. The others were at the [facility] when she lived in [facility]. No surgeries on the right.     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    She has no past medical history of Delayed emergence from general anesthesia, Diabetes mellitus (Multi), Hard to intubate, Malignant hyperthermia, Personal history of irradiation, PONV (postoperative nausea and vomiting), Refusal of blood product, or Sleep apnea.   has a past surgical history that includes Hysterectomy (October 2004); Stapedectomy (1981); Myomectomy; Cystoscopy; and Cochlear implant (Left, 2024).  Current Medications[1]  Review of patient's allergies indicates:  Allergies[2]  Social History and Family History: reviewed in the EMR  New patients fill out a 10-system review of systems survey that gets reviewed at their initial visit. Pertinent positives have been incorporated into the HPI.    PHYSICAL EXAM:   Vitals:    07/24/25 1609   Weight: 55.3 kg (122 lb)     The patient is well-developed, well-nourished and in no acute distress.    The patient is alert and oriented to  time, person, and place with appropriate mood and affect.     EARS: Examination of the external ears was normal using visual inspection. Handheld otoscopy was inadequate to provide fine detail and depth perception necessary for a full diagnostic assessment of the tympanic membrane and middle ear space. The otologic microscope was utilized to improve visualization. Use of the otologic microscope facilitates cleaning of the EAC and three-dimensional, magnified visualization of the tympanic membrane and middle ear structures for diagnosis of observable pathology and anatomical variations. Otoscopic and/or microscopic evaluation reveals:        Right:  External auditory canal: patent   Tympanic Membrane: intact and normal   Middle ear: well aerated       Left: External auditory canal: patent after cerumen removal  Tympanic Membrane: intact and normal   Middle ear: well aerated   - Osia magnet site is healthy  There is an area adjacent to the Osia (likely prior incision site) with blocked pores/hair follicles.    Eyes:  EOMI, vision grossly intact, no nystagmus   Neurologic:  CN 2-12 intact including normal facial movement and sensation     DATA REVIEWED:   AUDIOGRAMS   Date - 6/23/25      IMAGING  - no recent studies    OTHER  - none    PROCEDURE:  Cerumen Removal  Ear(s): left  A copious amount of wax blocked the external auditory canal, which prevented proper examination of the canal and tympanic membrane.  Therefore, using the binocular carl-microscope I removed the wax with micro-otologic instruments. This includes a Batista pick, loop curette and otologic suctions. After the wax removal and the canal was clean, I continued my exam of the external auditory canal and tympanic membrane. Please see the above exam section for exam details. The patient tolerated the procedure well.    ASSESSMENT & PLAN:  Problem List Items Addressed This Visit          ENT    Otosclerosis of both ears - Primary    Mixed hearing loss,  bilateral     Right ear: patient not interested in stapes surgery; she will consider updating her hearing aid    Left ear: continue Osia; monitor skin area with blocked pores/hair follicles, consider weekly cleaning with Oxy pad    Will benefit from monitoring cerumen accumulation (left > right)    -follow-up 1-year; coordinate with Osia audiology appointment    Rob Alarcon M.D.     Otology/Neurotology   Department of Otolaryngology - Head and Neck Surgery  [facility]  Phone/Appointments: [phone]   Fax: [phone]   E-mail: [email or web address]       [1]   Current Outpatient Medications:     aluminum chloride (Drysol) 20 % external solution, Apply topically once daily at bedtime., Disp: , Rfl:     calcium carbonate-vitamin D3 (Calcium 600 with Vitamin D3) 600 mg-12.5 mcg (500 unit) capsule, Take 1 tablet by mouth 2 times a day. For 90 days, Disp: , Rfl:     cetirizine (ZyrTEC) 10 mg capsule, Take by mouth., Disp: , Rfl:     ciclopirox (Penlac) 8 % solution, Apply topically once daily at bedtime., Disp: , Rfl:     clobetasol (Temovate) 0.05 % ointment, Apply small amount to perineum or vulva for flare/itching daily for 1 week, then three times a week for 1 week., Disp: 30 g, Rfl: 2    estradiol (Vagifem) 10 mcg tablet vaginal tablet, Insert 1 tablet (10 mcg) into the vagina 2 times a week. At bedtime., Disp: 18 tablet, Rfl: 3    ketoconazole (NIZOral) 2 % cream, Apply 1 Application topically once daily., Disp: , Rfl:     minoxidil (Loniten) 2.5 mg tablet, Take 1 tablet (2.5 mg) by mouth once daily. Take 1/2 tablet daily, Disp: , Rfl:     multivit-min/iron/FA/vit K/lut (CENTRUM SILVER WOMEN ORAL), Take 1 capsule by mouth once daily., Disp: , Rfl:     tacrolimus (Protopic) 0.1 % ointment, Apply topically 2 times a day., Disp: , Rfl:     tretinoin (Retin-A) 0.1 % cream, Apply topically once daily at bedtime., Disp: , Rfl:     Zoryve 0.3 %  foam, if needed. (Patient not taking: Reported on 7/24/2025), Disp: , Rfl:     Current Facility-Administered Medications:     fluconazole (Diflucan) tablet 150 mg, 150 mg, oral, Once, Ger De Los Santos MD    lidocaine 2 % mucosal jelly (Uro-Jet) 1 Application, 1 Application, urethral, Once, Etienne Aguilera MD  [2]   Allergies  Allergen Reactions    Wasp Venom Unknown

## 2025-08-06 ENCOUNTER — TREATMENT (OUTPATIENT)
Dept: PHYSICAL THERAPY | Facility: CLINIC | Age: 70
End: 2025-08-06
Payer: MEDICARE

## 2025-08-06 DIAGNOSIS — N39.3 FEMALE STRESS INCONTINENCE: Primary | ICD-10-CM

## 2025-08-06 PROCEDURE — 97140 MANUAL THERAPY 1/> REGIONS: CPT | Mod: GP

## 2025-08-06 PROCEDURE — 97110 THERAPEUTIC EXERCISES: CPT | Mod: GP

## 2025-08-06 ASSESSMENT — PAIN SCALES - GENERAL: PAINLEVEL_OUTOF10: 0 - NO PAIN

## 2025-08-06 ASSESSMENT — PAIN - FUNCTIONAL ASSESSMENT: PAIN_FUNCTIONAL_ASSESSMENT: 0-10

## 2025-08-06 NOTE — PROGRESS NOTES
Physical Therapy    Physical Therapy Treatment    Patient Name: Nichole Boss  MRN: [id number]  Today's Date: 8/6/2025    Time Entry:   Time Calculation  Start Time: 1420  Stop Time: 1506  Time Calculation (min): 46 min     PT Therapeutic Procedures Time Entry  Therapeutic Exercise Time Entry: 31  Manual Therapy Time Entry: 15                   Reason for Visit:  Referred by: Etienne Aguilera MD (5/1/2025)   Referral DX: Female stress incontinence [N39.3]      Insurance:  Visit: #5  Authorized: to be determined  Payor/Plan:  [employer] MEDICARE [employer] MEDICARE   Certification Period: 05/06/2025 to 08/04/2025  APPROVED 8 VISITS, 5/6/25-7/1/25, AUTH #[id number]     Current Problem  1. Female stress incontinence              Subjective    Date of Onset: ongoing, recent worsening last 1/2 year  Chief Complaint: urinary incontinence, pain with intercourse     Patient reports no leakage.  Some right hip/leg pain following dancing with wedges on.   Some leakage during intercourse but much better.     Mild pain with insertion at times- improved.    Precautions:  Precautions  STEADI Fall Risk Score (The score of 4 or more indicates an increased risk of falling): 0  Precautions Comment: None.  Pain:  Pain Assessment  Pain Assessment: 0-10  0-10 (Numeric) Pain Score: 0 - No pain    Objective   Myofascial trigger points revealed in the [right and left glute med, right and left piriformis] muscle(s) by dry needle technique with noted needle fibrillation, local twitch response, reproduction of symptoms including but not limited to aching, burning and electricity. Noted are atypical tissue morphology characteristics of abnormal density, palpable margins, contracted/fibrotic muscle and fascial tissue with resistance to penetration. These characteristics reflect abnormal tissue function, innervation and nervous system communication.      TREATMENT  Educated patient on course of treatment.     DRY NEEDLING 1-2 Muscles:  x 1 (15 minutes)  Patient was educated on risks and benefits associated with dry needling, what to expect, and post-procedure protocol (i.e. increased water intake, increased stretching, etc.). Patient was also educated on modifications to HEP.  Verbal consent was received to proceed with treatment.     Trigger point needling was performed to the following muscles (name muscles). All trigger/tender points were marked and noted. Patient was prepped with 70% Isopropyl alcohol to the site(s). Sterile, single use, solid filament needles were inserted at various depths and angles to release tight tissue, improve microcirculation and remove neuro-noxious chemicals via a myofascial twitch response.       Patient in prone lying, draped properly  .3x90 mm to right and left glute med x 1, right left piriformis x 1     THERAPEUTIC EXERCISE: x 2 ( minutes)   Figure 4 stretch each side x 2 30' hold  Glute bridge walk out x 10   Sing leg sit to stand with PFM contraction, opposite leg on ground for support if needed 2 x 8 each leg  Split leg jumps with table support x 6    Access Code: [email or web address]  URL: [email or web address]    Patient verbalizes and demonstrates understanding of home exercises. Patient will contact writer if with questions or if condition worsens. Provided patient with  email.      Charges: [id number], [id number]    Assessment:  Patient has had no leakage over last month. Continues to demonstrates higher level strength of BLE and pelvic floor. Minor leakage reported with split jumps- very challenging high level exercise. Responds well to dry needling today for residual hip muscle pain.     Plan:  FU as needed, continue independent home program  Use of silicone based lubrication for intercourse      Goals:  Patient will report no pain with intercourse in 8 weeks, improving quality of life. (Ongoing)  Patient will report 0 incidence of leakage with ADLs and IADLs in 12 weeks. (Goal Met)  Patient will  report Female GUPI score of < in 12 weeks indicating improved function and quality of life. (Did not assess)  Patient will be independent with HEP. (Ongoing)

## 2025-08-14 LAB
25(OH)D3+25(OH)D2 SERPL-MCNC: 34 NG/ML (ref 30–100)
ALBUMIN SERPL-MCNC: 4.4 G/DL (ref 3.6–5.1)
ALP SERPL-CCNC: 50 U/L (ref 37–153)
ALT SERPL-CCNC: 19 U/L (ref 6–29)
ANION GAP SERPL CALCULATED.4IONS-SCNC: 7 MMOL/L (CALC) (ref 7–17)
AST SERPL-CCNC: 18 U/L (ref 10–35)
BILIRUB SERPL-MCNC: 0.5 MG/DL (ref 0.2–1.2)
BUN SERPL-MCNC: 19 MG/DL (ref 7–25)
CALCIUM SERPL-MCNC: 9.1 MG/DL (ref 8.6–10.4)
CHLORIDE SERPL-SCNC: 105 MMOL/L (ref 98–110)
CHOLEST SERPL-MCNC: 224 MG/DL
CHOLEST/HDLC SERPL: 2.7 (CALC)
CO2 SERPL-SCNC: 29 MMOL/L (ref 20–32)
CREAT SERPL-MCNC: 0.57 MG/DL (ref 0.5–1.05)
EGFRCR SERPLBLD CKD-EPI 2021: 99 ML/MIN/1.73M2
ERYTHROCYTE [DISTWIDTH] IN BLOOD BY AUTOMATED COUNT: 12.5 % (ref 11–15)
GLUCOSE SERPL-MCNC: 92 MG/DL (ref 65–99)
HCT VFR BLD AUTO: 41.7 % (ref 35–45)
HDLC SERPL-MCNC: 82 MG/DL
HGB BLD-MCNC: 14 G/DL (ref 11.7–15.5)
LDLC SERPL CALC-MCNC: 123 MG/DL (CALC)
MCH RBC QN AUTO: 33.4 PG (ref 27–33)
MCHC RBC AUTO-ENTMCNC: 33.6 G/DL (ref 32–36)
MCV RBC AUTO: 99.5 FL (ref 80–100)
NONHDLC SERPL-MCNC: 142 MG/DL (CALC)
PLATELET # BLD AUTO: 254 THOUSAND/UL (ref 140–400)
PMV BLD REES-ECKER: 10.7 FL (ref 7.5–12.5)
POTASSIUM SERPL-SCNC: 4.3 MMOL/L (ref 3.5–5.3)
PROT SERPL-MCNC: 6.4 G/DL (ref 6.1–8.1)
RBC # BLD AUTO: 4.19 MILLION/UL (ref 3.8–5.1)
SODIUM SERPL-SCNC: 141 MMOL/L (ref 135–146)
TRIGL SERPL-MCNC: 91 MG/DL
TSH SERPL-ACNC: 2.88 MIU/L (ref 0.4–4.5)
WBC # BLD AUTO: 4.6 THOUSAND/UL (ref 3.8–10.8)

## 2025-10-20 ENCOUNTER — APPOINTMENT (OUTPATIENT)
Facility: CLINIC | Age: 70
End: 2025-10-20
Payer: MEDICARE

## 2026-06-25 ENCOUNTER — APPOINTMENT (OUTPATIENT)
Facility: CLINIC | Age: 71
End: 2026-06-25
Payer: MEDICARE

## (undated) DEVICE — COMB, HAIR, 7 IN, PLASTIC, BLACK

## (undated) DEVICE — NEEDLE, HYPODERMIC, MONOJECT, 25 G X 1.5 IN, LUER LOCK HUB, RED

## (undated) DEVICE — SOLUTION, IRRIGATION, USP, STERILE WATER, UROLOGICAL, 3000 ML, BAG

## (undated) DEVICE — DRILL, WIDENING 4MM W/COUNTERSINK BAHA3

## (undated) DEVICE — ADHESIVE, SKIN, LIQUIBAND EXCEED

## (undated) DEVICE — SUTURE, MONOCRYL, 4-0, 27 IN, PS-2, UNDYED

## (undated) DEVICE — SOLUTION, IRRIGATION, X RX SODIUM CHL 0.9%, 1000ML BTL

## (undated) DEVICE — STOCKINETTE, IMPERVIOUS, 12 X 48 IN, LF, STERILE

## (undated) DEVICE — GUIDE, DRILL 3MM/4MM BAHA3

## (undated) DEVICE — SYRINGE, 1 CC, LUER LOCK

## (undated) DEVICE — Device

## (undated) DEVICE — STRIP, SKIN CLOSURE, STERI STRIP, REINFORCED, 0.5 X 4 IN

## (undated) DEVICE — CUP, SOLUTION

## (undated) DEVICE — TEMPLATE, IMPLANT, OSIA OSI200

## (undated) DEVICE — DRESSING, EAR, GLASSCOCK, ADULT

## (undated) DEVICE — TUBING, SUCTION, 9 FT, STERILE, CLEAR

## (undated) DEVICE — PROTECTOR, NERVE, ULNAR, PINK

## (undated) DEVICE — SUTURE, VICRYL, 3-0,18 IN, SH, UNDYED

## (undated) DEVICE — IRRIGATION SET, CYSTOSCOPY, F/CONSTANT/INTERMITTENT, 8 GTT/CC, 77 IN